# Patient Record
Sex: FEMALE | Race: WHITE | Employment: UNEMPLOYED | URBAN - METROPOLITAN AREA
[De-identification: names, ages, dates, MRNs, and addresses within clinical notes are randomized per-mention and may not be internally consistent; named-entity substitution may affect disease eponyms.]

---

## 2023-12-12 LAB — MAMMOGRAPHY, EXTERNAL: NORMAL

## 2024-01-03 ENCOUNTER — OFFICE VISIT (OUTPATIENT)
Facility: CLINIC | Age: 54
End: 2024-01-03
Payer: OTHER GOVERNMENT

## 2024-01-03 VITALS
SYSTOLIC BLOOD PRESSURE: 135 MMHG | HEIGHT: 67 IN | WEIGHT: 250 LBS | BODY MASS INDEX: 39.24 KG/M2 | HEART RATE: 73 BPM | DIASTOLIC BLOOD PRESSURE: 82 MMHG | OXYGEN SATURATION: 98 % | TEMPERATURE: 98.9 F

## 2024-01-03 DIAGNOSIS — J01.00 ACUTE NON-RECURRENT MAXILLARY SINUSITIS: ICD-10-CM

## 2024-01-03 DIAGNOSIS — E11.69 DIABETES MELLITUS TYPE 2 IN OBESE (HCC): ICD-10-CM

## 2024-01-03 DIAGNOSIS — G93.5 ARNOLD-CHIARI MALFORMATION, TYPE I (HCC): ICD-10-CM

## 2024-01-03 DIAGNOSIS — J45.909 ASTHMA, ENDOGENOUS: ICD-10-CM

## 2024-01-03 DIAGNOSIS — E78.00 HIGH CHOLESTEROL: ICD-10-CM

## 2024-01-03 DIAGNOSIS — E66.9 DIABETES MELLITUS TYPE 2 IN OBESE (HCC): ICD-10-CM

## 2024-01-03 DIAGNOSIS — F33.2 ENDOGENOUS DEPRESSION (HCC): ICD-10-CM

## 2024-01-03 DIAGNOSIS — I10 ESSENTIAL HYPERTENSION: ICD-10-CM

## 2024-01-03 DIAGNOSIS — J01.00 ACUTE NON-RECURRENT MAXILLARY SINUSITIS: Primary | ICD-10-CM

## 2024-01-03 DIAGNOSIS — R05.1 ACUTE COUGH: ICD-10-CM

## 2024-01-03 PROBLEM — E11.9 DIABETES MELLITUS (HCC): Status: ACTIVE | Noted: 2024-01-03

## 2024-01-03 PROBLEM — M76.60 ACHILLES TENDINITIS: Status: ACTIVE | Noted: 2024-01-03

## 2024-01-03 PROBLEM — E66.3 OVERWEIGHT: Status: ACTIVE | Noted: 2024-01-03

## 2024-01-03 PROBLEM — H66.91 ACUTE RIGHT OTITIS MEDIA: Status: ACTIVE | Noted: 2023-08-01

## 2024-01-03 PROBLEM — K21.9 ACID REFLUX: Status: ACTIVE | Noted: 2024-01-03

## 2024-01-03 PROBLEM — F32.A DEPRESSIVE DISORDER: Status: ACTIVE | Noted: 2024-01-03

## 2024-01-03 PROBLEM — M25.569 ARTHRALGIA OF KNEE: Status: ACTIVE | Noted: 2024-01-03

## 2024-01-03 PROBLEM — J44.9 COPD (CHRONIC OBSTRUCTIVE PULMONARY DISEASE) (HCC): Status: ACTIVE | Noted: 2024-01-03

## 2024-01-03 PROBLEM — G44.89 HEADACHE SYNDROME: Status: ACTIVE | Noted: 2024-01-03

## 2024-01-03 PROBLEM — M79.673 PAIN OF FOOT: Status: ACTIVE | Noted: 2024-01-03

## 2024-01-03 PROBLEM — L98.0 PYOGENIC GRANULOMA: Status: ACTIVE | Noted: 2024-01-03

## 2024-01-03 PROBLEM — M77.9 BONE SPUR: Status: ACTIVE | Noted: 2024-01-03

## 2024-01-03 PROBLEM — E04.2 MULTIPLE THYROID NODULES: Status: ACTIVE | Noted: 2024-01-03

## 2024-01-03 PROCEDURE — 3079F DIAST BP 80-89 MM HG: CPT | Performed by: INTERNAL MEDICINE

## 2024-01-03 PROCEDURE — 99203 OFFICE O/P NEW LOW 30 MIN: CPT | Performed by: INTERNAL MEDICINE

## 2024-01-03 PROCEDURE — 3075F SYST BP GE 130 - 139MM HG: CPT | Performed by: INTERNAL MEDICINE

## 2024-01-03 RX ORDER — OXYCODONE AND ACETAMINOPHEN 7.5; 325 MG/1; MG/1
1 TABLET ORAL EVERY 6 HOURS PRN
COMMUNITY

## 2024-01-03 RX ORDER — TELMISARTAN 40 MG/1
40 TABLET ORAL NIGHTLY
COMMUNITY

## 2024-01-03 RX ORDER — FLUOXETINE HYDROCHLORIDE 40 MG/1
40 CAPSULE ORAL DAILY
COMMUNITY

## 2024-01-03 RX ORDER — HYDROCHLOROTHIAZIDE 25 MG/1
25 TABLET ORAL DAILY
COMMUNITY
Start: 2023-10-16

## 2024-01-03 RX ORDER — FLUTICASONE PROPIONATE 50 MCG
2 SPRAY, SUSPENSION (ML) NASAL DAILY
COMMUNITY
Start: 2023-10-17

## 2024-01-03 RX ORDER — METFORMIN HYDROCHLORIDE 500 MG/1
1000 TABLET, EXTENDED RELEASE ORAL
COMMUNITY
Start: 2023-10-17

## 2024-01-03 RX ORDER — BENZONATATE 200 MG/1
200 CAPSULE ORAL 3 TIMES DAILY PRN
Qty: 30 CAPSULE | Refills: 0 | Status: SHIPPED | OUTPATIENT
Start: 2024-01-03 | End: 2024-01-13

## 2024-01-03 RX ORDER — ATORVASTATIN CALCIUM 20 MG/1
20 TABLET, FILM COATED ORAL NIGHTLY
COMMUNITY

## 2024-01-03 RX ORDER — FAMOTIDINE 20 MG/1
20 TABLET, FILM COATED ORAL 2 TIMES DAILY
COMMUNITY

## 2024-01-03 RX ORDER — COVID-19 ANTIGEN TEST
200 KIT MISCELLANEOUS 3 TIMES DAILY
COMMUNITY

## 2024-01-03 RX ORDER — AMOXICILLIN AND CLAVULANATE POTASSIUM 875; 125 MG/1; MG/1
1 TABLET, FILM COATED ORAL 2 TIMES DAILY
Qty: 20 TABLET | Refills: 0 | Status: SHIPPED | OUTPATIENT
Start: 2024-01-03 | End: 2024-01-13

## 2024-01-03 RX ORDER — BUTALBITAL, ACETAMINOPHEN AND CAFFEINE 50; 325; 40 MG/1; MG/1; MG/1
1 TABLET ORAL EVERY 4 HOURS PRN
COMMUNITY

## 2024-01-03 RX ORDER — MULTIVIT WITH MINERALS/LUTEIN
1000 TABLET ORAL DAILY
COMMUNITY

## 2024-01-03 RX ORDER — AMLODIPINE BESYLATE 10 MG/1
10 TABLET ORAL NIGHTLY
COMMUNITY
Start: 2023-10-16

## 2024-01-03 RX ORDER — ZINC GLUCONATE 50 MG
50 TABLET ORAL NIGHTLY
COMMUNITY

## 2024-01-03 SDOH — ECONOMIC STABILITY: HOUSING INSECURITY
IN THE LAST 12 MONTHS, WAS THERE A TIME WHEN YOU DID NOT HAVE A STEADY PLACE TO SLEEP OR SLEPT IN A SHELTER (INCLUDING NOW)?: NO

## 2024-01-03 SDOH — ECONOMIC STABILITY: FOOD INSECURITY: WITHIN THE PAST 12 MONTHS, THE FOOD YOU BOUGHT JUST DIDN'T LAST AND YOU DIDN'T HAVE MONEY TO GET MORE.: NEVER TRUE

## 2024-01-03 SDOH — ECONOMIC STABILITY: FOOD INSECURITY: WITHIN THE PAST 12 MONTHS, YOU WORRIED THAT YOUR FOOD WOULD RUN OUT BEFORE YOU GOT MONEY TO BUY MORE.: NEVER TRUE

## 2024-01-03 SDOH — ECONOMIC STABILITY: INCOME INSECURITY: HOW HARD IS IT FOR YOU TO PAY FOR THE VERY BASICS LIKE FOOD, HOUSING, MEDICAL CARE, AND HEATING?: NOT HARD AT ALL

## 2024-01-03 ASSESSMENT — PATIENT HEALTH QUESTIONNAIRE - PHQ9
6. FEELING BAD ABOUT YOURSELF - OR THAT YOU ARE A FAILURE OR HAVE LET YOURSELF OR YOUR FAMILY DOWN: 3
9. THOUGHTS THAT YOU WOULD BE BETTER OFF DEAD, OR OF HURTING YOURSELF: 0
3. TROUBLE FALLING OR STAYING ASLEEP: 0
5. POOR APPETITE OR OVEREATING: 0
SUM OF ALL RESPONSES TO PHQ QUESTIONS 1-9: 9
7. TROUBLE CONCENTRATING ON THINGS, SUCH AS READING THE NEWSPAPER OR WATCHING TELEVISION: 0
1. LITTLE INTEREST OR PLEASURE IN DOING THINGS: 3
SUM OF ALL RESPONSES TO PHQ9 QUESTIONS 1 & 2: 6
SUM OF ALL RESPONSES TO PHQ QUESTIONS 1-9: 9
2. FEELING DOWN, DEPRESSED OR HOPELESS: 3
8. MOVING OR SPEAKING SO SLOWLY THAT OTHER PEOPLE COULD HAVE NOTICED. OR THE OPPOSITE, BEING SO FIGETY OR RESTLESS THAT YOU HAVE BEEN MOVING AROUND A LOT MORE THAN USUAL: 0
10. IF YOU CHECKED OFF ANY PROBLEMS, HOW DIFFICULT HAVE THESE PROBLEMS MADE IT FOR YOU TO DO YOUR WORK, TAKE CARE OF THINGS AT HOME, OR GET ALONG WITH OTHER PEOPLE: 2
SUM OF ALL RESPONSES TO PHQ QUESTIONS 1-9: 9
SUM OF ALL RESPONSES TO PHQ QUESTIONS 1-9: 9

## 2024-01-03 ASSESSMENT — ENCOUNTER SYMPTOMS
NAUSEA: 0
SHORTNESS OF BREATH: 0
ABDOMINAL PAIN: 0
COUGH: 1
DIARRHEA: 0
VOMITING: 0

## 2024-01-03 NOTE — PATIENT INSTRUCTIONS
Advised sinus rinse and saline gargle. Can take Tylenol or aleve for discomfort,Delsym for cough ,zyrtec or allegra or coricidine  for congestion.Encouraged to do steam inhalation and increasing fluid intake.

## 2024-01-03 NOTE — PROGRESS NOTES
Positive for cough. Negative for shortness of breath.    Cardiovascular:  Negative for chest pain and leg swelling.   Gastrointestinal:  Negative for abdominal pain, diarrhea, nausea and vomiting.   Genitourinary:  Negative for dysuria.   Skin:  Negative for rash.   Neurological:  Negative for tremors and headaches.   Psychiatric/Behavioral:  The patient is not nervous/anxious.        /82 (Site: Right Upper Arm, Position: Sitting)   Pulse 73   Temp 98.9 °F (37.2 °C)   Ht 1.702 m (5' 7\")   Wt 113.4 kg (250 lb)   SpO2 98%   BMI 39.16 kg/m²      Physical Exam  Vitals and nursing note reviewed.    Gen:  Not  in no acute distress. Well built and nourished.Sickly with cough.  HEENT:  [place] conjunctivae, SANDRO, EOMI, hearing intact .  Positive bilateral maxillary sinus tenderness.  Mouth: Moist mucous membranes. No TPC  Neck:  Supple, without masses, thyroid not enlarged  Resp:  No accessory muscle use, clear breath sounds without wheezes rales or rhonchi  Card:  No murmurs, normal S1, S2 without thrills, bruits or peripheral edema  Abd:  Soft, non-tender, non-distended, normoactive bowel sounds are present, no palpable organomegaly and no detectable hernias  Lymph:  No cervical or inguinal adenopathy  Musc:  No cyanosis or clubbing.   Gait: Normal  Skin:  No rashes or ulcers, skin turgor is good  Neuro: Alert and oriented x 3. Cranial nerves are grossly intact, no focal motor weakness, follows commands appropriately  Psych:  Good insight, mood normal.     ASSESSMENT/PLAN:  Below is the assessment and plan developed based on review of pertinent history, physical exam, labs, studies, and medications.    1. Acute non-recurrent maxillary sinusitis  Comments:  Advised to do COVID testing.  Will place her on Augmentin.  Orders:  -     CBC with Auto Differential; Future  -     amoxicillin-clavulanate (AUGMENTIN) 875-125 MG per tablet; Take 1 tablet by mouth 2 times daily for 10 days, Disp-20 tablet, R-0Normal  2.

## 2024-05-21 LAB
ALBUMIN SERPL-MCNC: 4.4 G/DL (ref 3.8–4.9)
ALBUMIN/GLOB SERPL: 1.6 {RATIO} (ref 1.2–2.2)
ALP SERPL-CCNC: 117 IU/L (ref 44–121)
ALT SERPL-CCNC: 25 IU/L (ref 0–32)
AST SERPL-CCNC: 18 IU/L (ref 0–40)
BASOPHILS # BLD AUTO: 0.1 X10E3/UL (ref 0–0.2)
BASOPHILS NFR BLD AUTO: 1 %
BILIRUB SERPL-MCNC: 0.3 MG/DL (ref 0–1.2)
BUN SERPL-MCNC: 9 MG/DL (ref 6–24)
BUN/CREAT SERPL: 13 (ref 9–23)
CALCIUM SERPL-MCNC: 10 MG/DL (ref 8.7–10.2)
CHLORIDE SERPL-SCNC: 97 MMOL/L (ref 96–106)
CHOLEST SERPL-MCNC: 173 MG/DL (ref 100–199)
CO2 SERPL-SCNC: 26 MMOL/L (ref 20–29)
CREAT SERPL-MCNC: 0.71 MG/DL (ref 0.57–1)
EGFRCR SERPLBLD CKD-EPI 2021: 102 ML/MIN/1.73
EOSINOPHIL # BLD AUTO: 0.3 X10E3/UL (ref 0–0.4)
EOSINOPHIL NFR BLD AUTO: 5 %
ERYTHROCYTE [DISTWIDTH] IN BLOOD BY AUTOMATED COUNT: 14.1 % (ref 11.7–15.4)
GLOBULIN SER CALC-MCNC: 2.8 G/DL (ref 1.5–4.5)
GLUCOSE SERPL-MCNC: 210 MG/DL (ref 70–99)
HBA1C MFR BLD: 10.1 % (ref 4.8–5.6)
HCT VFR BLD AUTO: 44.4 % (ref 34–46.6)
HDLC SERPL-MCNC: 49 MG/DL
HGB BLD-MCNC: 14.2 G/DL (ref 11.1–15.9)
IMM GRANULOCYTES # BLD AUTO: 0 X10E3/UL (ref 0–0.1)
IMM GRANULOCYTES NFR BLD AUTO: 0 %
LDLC SERPL CALC-MCNC: 77 MG/DL (ref 0–99)
LYMPHOCYTES # BLD AUTO: 2.2 X10E3/UL (ref 0.7–3.1)
LYMPHOCYTES NFR BLD AUTO: 32 %
MCH RBC QN AUTO: 28.6 PG (ref 26.6–33)
MCHC RBC AUTO-ENTMCNC: 32 G/DL (ref 31.5–35.7)
MCV RBC AUTO: 89 FL (ref 79–97)
MONOCYTES # BLD AUTO: 0.6 X10E3/UL (ref 0.1–0.9)
MONOCYTES NFR BLD AUTO: 8 %
NEUTROPHILS # BLD AUTO: 3.7 X10E3/UL (ref 1.4–7)
NEUTROPHILS NFR BLD AUTO: 54 %
PLATELET # BLD AUTO: 264 X10E3/UL (ref 150–450)
POTASSIUM SERPL-SCNC: 4.1 MMOL/L (ref 3.5–5.2)
PROT SERPL-MCNC: 7.2 G/DL (ref 6–8.5)
RBC # BLD AUTO: 4.97 X10E6/UL (ref 3.77–5.28)
SODIUM SERPL-SCNC: 138 MMOL/L (ref 134–144)
TRIGL SERPL-MCNC: 293 MG/DL (ref 0–149)
TSH SERPL DL<=0.005 MIU/L-ACNC: 0.34 UIU/ML (ref 0.45–4.5)
VLDLC SERPL CALC-MCNC: 47 MG/DL (ref 5–40)
WBC # BLD AUTO: 6.9 X10E3/UL (ref 3.4–10.8)

## 2024-05-22 ENCOUNTER — OFFICE VISIT (OUTPATIENT)
Facility: CLINIC | Age: 54
End: 2024-05-22
Payer: OTHER GOVERNMENT

## 2024-05-22 VITALS
TEMPERATURE: 97.8 F | SYSTOLIC BLOOD PRESSURE: 126 MMHG | DIASTOLIC BLOOD PRESSURE: 85 MMHG | OXYGEN SATURATION: 98 % | WEIGHT: 228 LBS | RESPIRATION RATE: 16 BRPM | HEART RATE: 76 BPM | HEIGHT: 67 IN | BODY MASS INDEX: 35.79 KG/M2

## 2024-05-22 DIAGNOSIS — E05.90 SUBCLINICAL HYPERTHYROIDISM: ICD-10-CM

## 2024-05-22 DIAGNOSIS — J01.00 ACUTE NON-RECURRENT MAXILLARY SINUSITIS: ICD-10-CM

## 2024-05-22 DIAGNOSIS — G93.5 ARNOLD-CHIARI MALFORMATION, TYPE I (HCC): ICD-10-CM

## 2024-05-22 DIAGNOSIS — I10 ESSENTIAL HYPERTENSION: ICD-10-CM

## 2024-05-22 DIAGNOSIS — E11.65 TYPE 2 DIABETES MELLITUS WITH HYPERGLYCEMIA, WITHOUT LONG-TERM CURRENT USE OF INSULIN (HCC): Primary | ICD-10-CM

## 2024-05-22 DIAGNOSIS — R42 DIZZINESS: ICD-10-CM

## 2024-05-22 DIAGNOSIS — E78.00 HIGH CHOLESTEROL: ICD-10-CM

## 2024-05-22 DIAGNOSIS — G44.89 HEADACHE SYNDROME: ICD-10-CM

## 2024-05-22 DIAGNOSIS — F33.2 ENDOGENOUS DEPRESSION (HCC): ICD-10-CM

## 2024-05-22 PROCEDURE — 3074F SYST BP LT 130 MM HG: CPT | Performed by: INTERNAL MEDICINE

## 2024-05-22 PROCEDURE — 99214 OFFICE O/P EST MOD 30 MIN: CPT | Performed by: INTERNAL MEDICINE

## 2024-05-22 PROCEDURE — 3046F HEMOGLOBIN A1C LEVEL >9.0%: CPT | Performed by: INTERNAL MEDICINE

## 2024-05-22 PROCEDURE — 3079F DIAST BP 80-89 MM HG: CPT | Performed by: INTERNAL MEDICINE

## 2024-05-22 RX ORDER — MECLIZINE HYDROCHLORIDE 25 MG/1
25 TABLET ORAL 3 TIMES DAILY PRN
Qty: 30 TABLET | Refills: 0 | Status: SHIPPED | OUTPATIENT
Start: 2024-05-22 | End: 2024-06-01

## 2024-05-22 RX ORDER — CEFDINIR 300 MG/1
300 CAPSULE ORAL 2 TIMES DAILY
Qty: 20 CAPSULE | Refills: 0 | Status: SHIPPED | OUTPATIENT
Start: 2024-05-22 | End: 2024-06-01

## 2024-05-22 RX ORDER — PANCRELIPASE 36000; 180000; 114000 [USP'U]/1; [USP'U]/1; [USP'U]/1
CAPSULE, DELAYED RELEASE PELLETS ORAL
COMMUNITY
Start: 2024-05-06

## 2024-05-22 RX ORDER — OMEPRAZOLE 20 MG/1
20 CAPSULE, DELAYED RELEASE ORAL DAILY
COMMUNITY
Start: 2024-05-15

## 2024-05-22 RX ORDER — POTASSIUM CHLORIDE 750 MG/1
10 TABLET, FILM COATED, EXTENDED RELEASE ORAL 2 TIMES DAILY
COMMUNITY
Start: 2024-04-23 | End: 2024-05-22 | Stop reason: ALTCHOICE

## 2024-05-22 RX ORDER — GLYBURIDE-METFORMIN HYDROCHLORIDE 5; 500 MG/1; MG/1
1 TABLET ORAL 2 TIMES DAILY WITH MEALS
Qty: 180 TABLET | Refills: 3 | Status: SHIPPED | OUTPATIENT
Start: 2024-05-22

## 2024-05-22 RX ORDER — LANCETS 30 GAUGE
1 EACH MISCELLANEOUS 2 TIMES DAILY
Qty: 100 EACH | Refills: 5 | Status: SHIPPED | OUTPATIENT
Start: 2024-05-22

## 2024-05-22 RX ORDER — GLUCOSAMINE HCL/CHONDROITIN SU 500-400 MG
CAPSULE ORAL
Qty: 100 STRIP | Refills: 3 | Status: SHIPPED | OUTPATIENT
Start: 2024-05-22

## 2024-05-22 RX ORDER — BLOOD-GLUCOSE METER
1 KIT MISCELLANEOUS DAILY
Qty: 1 KIT | Refills: 0 | Status: SHIPPED | OUTPATIENT
Start: 2024-05-22

## 2024-05-22 ASSESSMENT — ENCOUNTER SYMPTOMS
DIARRHEA: 0
ABDOMINAL PAIN: 0
SHORTNESS OF BREATH: 0
VOMITING: 0
COUGH: 0
NAUSEA: 0

## 2024-05-22 NOTE — ASSESSMENT & PLAN NOTE
A1c 10.1.  Seems like patient was unable to tolerate metformin last 2 months because of vomiting and diarrhea.  Will change to Glucovance 5/500 twice a day.  Also will make appointment with endocrinologist and for diabetic education classes.

## 2024-05-22 NOTE — PROGRESS NOTES
Chief Complaint   Patient presents with    Follow-up Chronic Condition    Dizziness     C/O feeling \"off balance\" this morning    Discuss Labs             /85 (Site: Right Upper Arm, Position: Sitting)   Pulse 76   Temp 97.8 °F (36.6 °C)   Resp 16   Ht 1.702 m (5' 7\")   Wt 103.4 kg (228 lb)   SpO2 98%   BMI 35.71 kg/m²           \"Have you been to the ER, urgent care clinic since your last visit?  Hospitalized since your last visit?\"    YES - When: approximately 2 months ago.  Where and Why: [place] stomach issues .    “Have you seen or consulted any other health care providers outside of [facility] since your last visit?”    YES - When: approximately 1 months ago.  Where and Why: [facility].    Have you had a mammogram?”   YES - Where: [place]  Nurse/CMA to request most recent records if not in the chart    No breast cancer screening on file      “Have you had a pap smear?”    YES - Where: 2023 [facility]  Nurse/CMA to request most recent records if not in the chart    No cervical cancer screening on file         “Have you had a colorectal cancer screening such as a colonoscopy/FIT/Cologuard?    NO    No colonoscopy on file  No cologuard on file  No FIT/FOBT on file   No flexible sigmoidoscopy on file         Click Here for Release of Records Request    
1 tablet by mouth 3 times daily as needed for Dizziness, Disp-30 tablet, R-0Normal  4. Essential hypertension  Comments:  Blood pressure controlled.  Current medications.  5. Arnold-Chiari malformation, type I (HCC)  Comments:  Waiting for neurology appointment  6. Endogenous depression (HCC)  Comments:  Will make appointment with psychiatrist  Orders:  -     Amb External Referral To Psychiatry  7. High cholesterol  -     Lipid Panel; Future  8. Headache syndrome  Comments:  Waiting for appointment with the neurologist  9. Subclinical hyperthyroidism  Comments:  TSH mildly suppressed.  Will make appointment with endocrinologist  Orders:  -     TSH reflex to T4F; Future      Orders Placed This Encounter    Microalbumin / Creatinine Urine Ratio     Standing Status:   Future     Standing Expiration Date:   5/22/2025    Urinalysis     Standing Status:   Future     Standing Expiration Date:   5/22/2025    Lipid Panel     Standing Status:   Future     Standing Expiration Date:   5/22/2025     Order Specific Question:   Is Patient Fasting?/# of Hours     Answer:   Yes     Order Specific Question:   Has the patient fasted?     Answer:   Yes    Comprehensive Metabolic Panel     Standing Status:   Future     Standing Expiration Date:   5/22/2025    Hemoglobin A1C     Standing Status:   Future     Standing Expiration Date:   5/22/2025    TSH reflex to T4F     Standing Status:   Future     Standing Expiration Date:   5/22/2025    Ambulatory referral to Diabetic Education     Referral Priority:   Routine     Referral Type:   Eval and Treat     Referral Reason:   Specialty Services Required     Number of Visits Requested:   1    [facility] - Connie Hooker MD, Endocrinology, [place]     Referral Priority:   Routine     Referral Type:   Eval and Treat     Referral Reason:   Specialty Services Required     Referred to Provider:   Connie Hooker MD     Requested Specialty:   Endocrinology     Number of Visits Requested:   1

## 2024-05-29 ENCOUNTER — TELEPHONE (OUTPATIENT)
Facility: CLINIC | Age: 54
End: 2024-05-29

## 2024-05-29 NOTE — TELEPHONE ENCOUNTER
Patient states the prescriber in [place] are still having issues with their system and she wants to know if you can send butalbital-acetaminophen-caffeine -40 MG, Tizanidine 4  quantity 90 day supply. Please send to [facility] in [facility] on [address]

## 2024-05-31 NOTE — TELEPHONE ENCOUNTER
Please let patient know I am not  comfortable in writing 90-day supplies of Fioricet and tizanidine.

## 2024-06-10 ENCOUNTER — OFFICE VISIT (OUTPATIENT)
Age: 54
End: 2024-06-10
Payer: OTHER GOVERNMENT

## 2024-06-10 DIAGNOSIS — E11.65 TYPE 2 DIABETES MELLITUS WITH HYPERGLYCEMIA, WITHOUT LONG-TERM CURRENT USE OF INSULIN (HCC): Primary | ICD-10-CM

## 2024-06-10 PROCEDURE — G0108 DIAB MANAGE TRN  PER INDIV: HCPCS | Performed by: DIETITIAN, REGISTERED

## 2024-06-10 NOTE — PROGRESS NOTES
next month, I will begin to eat more balanced meals and snacks. 5   2. Within the next month, I will choose an exercise activity and I will start fitting it into my schedule. 8   3. Within the next month, I will make a list of stress management options that work for me. 6   4. Within the next month, I will consistently plan ahead to prevent low blood sugars. 4   5. Within the next month, I will start adjusting my insulin doses on my own. 0   6. Within the next month, I will begin making changes to my diabetes management based on my daily blood sugars (eg - eating, activity and/or insulin). 6   7. Within the next month, I will begin making changes to my diabetes management to meet my overall goals (eg - eating, activity and/or insulin). 6

## 2024-07-09 ENCOUNTER — NURSE ONLY (OUTPATIENT)
Age: 54
End: 2024-07-09
Payer: OTHER GOVERNMENT

## 2024-07-09 DIAGNOSIS — E11.9 TYPE 2 DIABETES MELLITUS WITHOUT COMPLICATION, WITHOUT LONG-TERM CURRENT USE OF INSULIN (HCC): Primary | ICD-10-CM

## 2024-07-09 PROCEDURE — G0109 DIAB MANAGE TRN IND/GROUP: HCPCS

## 2024-07-10 ENCOUNTER — OFFICE VISIT (OUTPATIENT)
Age: 54
End: 2024-07-10
Payer: OTHER GOVERNMENT

## 2024-07-10 VITALS
HEART RATE: 75 BPM | TEMPERATURE: 98.3 F | HEIGHT: 67 IN | SYSTOLIC BLOOD PRESSURE: 130 MMHG | DIASTOLIC BLOOD PRESSURE: 77 MMHG | WEIGHT: 237 LBS | BODY MASS INDEX: 37.2 KG/M2 | OXYGEN SATURATION: 99 %

## 2024-07-10 DIAGNOSIS — E06.9 THYROIDITIS: Primary | ICD-10-CM

## 2024-07-10 DIAGNOSIS — E78.2 MIXED HYPERLIPIDEMIA: ICD-10-CM

## 2024-07-10 DIAGNOSIS — E11.65 TYPE 2 DIABETES MELLITUS WITH HYPERGLYCEMIA, UNSPECIFIED WHETHER LONG TERM INSULIN USE (HCC): ICD-10-CM

## 2024-07-10 DIAGNOSIS — E04.2 MULTINODULAR GOITER: ICD-10-CM

## 2024-07-10 LAB
T4 FREE SERPL-MCNC: 1.1 NG/DL (ref 0.8–1.5)
TSH SERPL DL<=0.05 MIU/L-ACNC: 0.32 UIU/ML (ref 0.36–3.74)

## 2024-07-10 PROCEDURE — 3075F SYST BP GE 130 - 139MM HG: CPT | Performed by: INTERNAL MEDICINE

## 2024-07-10 PROCEDURE — 99244 OFF/OP CNSLTJ NEW/EST MOD 40: CPT | Performed by: INTERNAL MEDICINE

## 2024-07-10 PROCEDURE — 3078F DIAST BP <80 MM HG: CPT | Performed by: INTERNAL MEDICINE

## 2024-07-10 NOTE — PATIENT INSTRUCTIONS
I have ordered scan/test and if you do not hear from the hospital in 3- 4 business days  please call the number [phone] to speak with the scheduling team directly.

## 2024-07-10 NOTE — PROGRESS NOTES
[facility] DIABETES AND ENDOCRINOLOGY               Connie Hooker MD        Patient Information  Name : Nikki Lozano 53 y.o.     YOB: 1970         Referred by: Oneida Fang MD         Chief Complaint   Patient presents with    New Patient    Thyroid Problem       History of present illness    Nikki Lozano is a 53 y.o. female  here for evaluation of multinodular goiter which was diagnosed in 2007, had FNA in [place], then in 2022 in [place], FNA was benign  Feels cold  No change in the size of the neck or neck pain.No dysphagia,dysphonia or dyspnea.  Denies nervousness,shakiness,palpitations  No heat intolerance,  No history of known radiation exposure  FH of thyroid disease: Sister.No FH of thyroid cancer    Type 2 diabetes mellitus: Was off medications, last A1c 10, she has changed the diet, has seen dietitian, recently started on Glucovance, fasting blood glucose less than 120  She is planning to lose weight  No severe hypoglycemia  Was on metformin in the past  Underlying EPI, on Creon  No history of pancreatitis, no family history of medullary thyroid cancer      Wt Readings from Last 3 Encounters:   07/10/24 107.5 kg (237 lb)   05/22/24 103.4 kg (228 lb)   01/03/24 113.4 kg (250 lb)       Past Medical History:   Diagnosis Date    Allergies     Asthma     Depression     Diabetes (HCC)     GERD (gastroesophageal reflux disease)     Migraine        Current Outpatient Medications   Medication Sig    CREON 97112-979587 units CPEP delayed release capsule Take by mouth    omeprazole (PRILOSEC) 20 MG delayed release capsule Take 1 capsule by mouth Daily    glyBURIDE-metFORMIN (GLUCOVANCE) 5-500 MG per tablet Take 1 tablet by mouth 2 times daily (with meals)    glucose monitoring kit 1 kit by Does not apply route daily Use twice daily glucose testing. E11.65    blood glucose monitor strips Test 2 times a day & as needed for symptoms of irregular blood glucose. Dispense sufficient amount for

## 2024-07-10 NOTE — PROGRESS NOTES
[facility]  Diabetes Self-Management Education & Support Program  Encounter Note      SUMMARY  Diabetes self-care management training was completed related to reducing risks. The participant will return on July 16 to continue DSMES related to healthy eating and monitoring. The participant did identify SMART Goal(s) and will practice knowledge and skills related to reducing risks to improve diabetes self-management.        EVALUATION:  Cristel shared in group discussions during class.  She identified her diabetes care team.  She is up to date on annual vaccines and annual exams with the exception of eye exam.  She shared that she snores loudly at night when discussing sleep apnea.      RECOMMENDATIONS:  Make appt for annual eye exam     TOPICS DISCUSSED TODAY:  WHAT IS DIABETES? Minutes: 60  HOW DO I STAY HEALTHY? 60      Next provider visit is scheduled for 7/16/24       SMART GOAL(S)   Make appointment for annual diabetic eye exam  ACHIEVEMENT OF GOAL(S) : 0-24%    2.   Check blood glucose 1 times per day over the next week.- fasting blood glucose  ACHIEVEMENT OF GOAL(S) :  0-24%         DATE DSMES TOPIC EVALUATION     7/10/2024 WHAT IS DIABETES?   Role of the normal pancreas in energy balance and blood glucose control   The defect seen in diabetes   Signs & symptoms of diabetes   Diagnosis of diabetes   Types of diabetes   Blood glucose targets in non-pregnant & non-pregnant adults       The participant knows  Their type of diabetes: Yes   The basic physiologic defect: Yes  Blood glucose targets: Yes     DATE DSMES TOPIC EVALUATION     7/10/2024 HOW DO I STAY HEALTHY?   Prevention   Vaccinations   Preconception care (if applicable)  Examinations   Eye    Foot   Diabetic complications' prevention   Dental health   Heart health   Kidney Health   Nerve health   Sleep health      The participant has a personal diabetes care record to keep abreast of diabetes health Yes     .

## 2024-07-11 LAB
T3 SERPL-MCNC: 118 NG/DL (ref 71–180)
THYROPEROXIDASE AB SERPL-ACNC: <9 IU/ML (ref 0–34)

## 2024-07-16 ENCOUNTER — NURSE ONLY (OUTPATIENT)
Age: 54
End: 2024-07-16
Payer: OTHER GOVERNMENT

## 2024-07-16 DIAGNOSIS — E11.9 TYPE 2 DIABETES MELLITUS WITHOUT COMPLICATION, WITHOUT LONG-TERM CURRENT USE OF INSULIN (HCC): Primary | ICD-10-CM

## 2024-07-16 PROCEDURE — G0109 DIAB MANAGE TRN IND/GROUP: HCPCS

## 2024-07-17 DIAGNOSIS — E11.65 TYPE 2 DIABETES MELLITUS WITH HYPERGLYCEMIA, WITHOUT LONG-TERM CURRENT USE OF INSULIN (HCC): ICD-10-CM

## 2024-07-17 PROBLEM — E06.9 THYROIDITIS: Status: ACTIVE | Noted: 2024-07-17

## 2024-07-17 PROBLEM — E78.2 MIXED HYPERLIPIDEMIA: Status: ACTIVE | Noted: 2024-07-17

## 2024-07-17 RX ORDER — GLYBURIDE-METFORMIN HYDROCHLORIDE 5; 500 MG/1; MG/1
1 TABLET ORAL 2 TIMES DAILY WITH MEALS
Qty: 180 TABLET | Refills: 1 | Status: SHIPPED | OUTPATIENT
Start: 2024-07-17

## 2024-07-17 NOTE — PROGRESS NOTES
selection    Technique for obtaining blood droplet   Blood glucose testing sites   Determining best times to test   Pregnancy recommendations   Data sharing with provider        The participant   Can demonstrate their glucometer procedure: Yes  Logs their BG readings:  Yes           TERESA BRADLEY RN on 7/17/2024 at 10:39 AM    I have personally reviewed the health record, including provider notes, laboratory data and current medications before making these care and education recommendations. The time spent in this effort is included in the total time.  Total minutes: 120

## 2024-07-18 RX ORDER — FLUOXETINE HYDROCHLORIDE 40 MG/1
40 CAPSULE ORAL DAILY
Qty: 90 CAPSULE | Refills: 0 | Status: SHIPPED | OUTPATIENT
Start: 2024-07-18

## 2024-07-23 ENCOUNTER — NURSE ONLY (OUTPATIENT)
Age: 54
End: 2024-07-23
Payer: OTHER GOVERNMENT

## 2024-07-23 DIAGNOSIS — E11.65 TYPE 2 DIABETES MELLITUS WITH HYPERGLYCEMIA, WITHOUT LONG-TERM CURRENT USE OF INSULIN (HCC): Primary | ICD-10-CM

## 2024-07-23 PROCEDURE — G0109 DIAB MANAGE TRN IND/GROUP: HCPCS | Performed by: DIETITIAN, REGISTERED

## 2024-07-23 NOTE — PROGRESS NOTES
[facility]  Diabetes Self-Management Education & Support Program  Encounter Note      SUMMARY  Diabetes self-care management training was completed related to taking medications and physical activity. The participant will return on July 30 to continue DSMES related to healthy coping and problem solving. The participant did identify SMART Goal(s) and will practice knowledge and skills related to reducing risks, healthy eating and monitoring, and being active and medications to improve diabetes self-management.        EVALUATION:  Ms. Lozano shared in group discussions. She told the class that she experiences difficulty remembering to take her evening Metformin. She participated in group exercise activity. She admits to still drinking Pepsi throughout the day.    RECOMMENDATIONS:  Aim for 150 minutes of moderate to intense PA per week  Set an alarm for remembering to take evening metformin     TOPICS DISCUSSED TODAY:  HOW DO MY DIABETES MEDICATIONS WORK? 60  HOW DOES PHYSICAL ACTIVITY AFFECT MY DIABETES? 60      Next provider visit is scheduled for 7/26/24 c Dr. Huerta       SMART GOAL(S)  Practice building a balanced meal for lunch and dinner at least 3 times over the next week   ACHIEVEMENT OF GOAL(S) : 25-49%    2.   Increase physical activity by doing chair exercise with her   for 20  minutes 3 times over the next week.  ACHIEVEMENT OF GOAL(S) :  0-24%         DATE DSMES TOPIC EVALUATION     7/23/2024 HOW DO MY DIABETES MEDICATIONS WORK?   Type 1 medications & methods   Insulin injections   Injection sites   Type 2 medications   Oral agents   GLP-1 agonists   Hypoglycemia symptoms & treatment   Glucagon emergency kits   General guidance regarding insulin use whether Type 1, 2 or gestational diabetes   Storage of insulin   Disposal    Traveling with medications   Barriers to medication adherence      The participant   Can describe the expected action & side effects of prescribed

## 2024-07-26 ENCOUNTER — TELEPHONE (OUTPATIENT)
Dept: PRIMARY CARE CLINIC | Facility: CLINIC | Age: 54
End: 2024-07-26

## 2024-07-26 ENCOUNTER — OFFICE VISIT (OUTPATIENT)
Dept: PRIMARY CARE CLINIC | Facility: CLINIC | Age: 54
End: 2024-07-26

## 2024-07-26 VITALS
OXYGEN SATURATION: 98 % | HEIGHT: 67 IN | SYSTOLIC BLOOD PRESSURE: 120 MMHG | DIASTOLIC BLOOD PRESSURE: 85 MMHG | BODY MASS INDEX: 37.29 KG/M2 | RESPIRATION RATE: 16 BRPM | WEIGHT: 237.6 LBS | HEART RATE: 85 BPM | TEMPERATURE: 98.5 F

## 2024-07-26 DIAGNOSIS — G93.5 ARNOLD-CHIARI MALFORMATION, TYPE I (HCC): ICD-10-CM

## 2024-07-26 DIAGNOSIS — G93.5 ARNOLD-CHIARI MALFORMATION, TYPE I (HCC): Primary | ICD-10-CM

## 2024-07-26 DIAGNOSIS — M19.041 LOCALIZED OSTEOARTHRITIS OF RIGHT HAND: ICD-10-CM

## 2024-07-26 DIAGNOSIS — M65.4 DE QUERVAIN'S DISEASE (TENOSYNOVITIS): ICD-10-CM

## 2024-07-26 DIAGNOSIS — M25.561 ACUTE PAIN OF RIGHT KNEE: ICD-10-CM

## 2024-07-26 RX ORDER — OXYCODONE AND ACETAMINOPHEN 7.5; 325 MG/1; MG/1
1 TABLET ORAL EVERY 6 HOURS PRN
Qty: 120 TABLET | Refills: 0 | Status: SHIPPED | OUTPATIENT
Start: 2024-07-26 | End: 2024-08-25

## 2024-07-26 RX ORDER — TRIAMCINOLONE ACETONIDE 40 MG/ML
10 INJECTION, SUSPENSION INTRA-ARTICULAR; INTRAMUSCULAR ONCE
Status: COMPLETED | OUTPATIENT
Start: 2024-07-26 | End: 2024-07-26

## 2024-07-26 RX ORDER — TRIAMCINOLONE ACETONIDE 40 MG/ML
20 INJECTION, SUSPENSION INTRA-ARTICULAR; INTRAMUSCULAR ONCE
Status: COMPLETED | OUTPATIENT
Start: 2024-07-26 | End: 2024-07-26

## 2024-07-26 RX ORDER — TIZANIDINE HYDROCHLORIDE 4 MG/1
4 CAPSULE, GELATIN COATED ORAL 3 TIMES DAILY
COMMUNITY

## 2024-07-26 RX ORDER — OXYCODONE AND ACETAMINOPHEN 7.5; 325 MG/1; MG/1
1 TABLET ORAL EVERY 6 HOURS PRN
Qty: 120 TABLET | Refills: 0 | Status: SHIPPED | OUTPATIENT
Start: 2024-07-26 | End: 2024-07-26 | Stop reason: SDUPTHER

## 2024-07-26 RX ORDER — BUTALBITAL, ACETAMINOPHEN AND CAFFEINE 50; 325; 40 MG/1; MG/1; MG/1
2 TABLET ORAL EVERY 6 HOURS PRN
Qty: 180 TABLET | Refills: 3 | Status: SHIPPED | OUTPATIENT
Start: 2024-07-26

## 2024-07-26 RX ORDER — TIZANIDINE 4 MG/1
4 TABLET ORAL 3 TIMES DAILY PRN
Qty: 270 TABLET | Refills: 3 | Status: SHIPPED | OUTPATIENT
Start: 2024-07-26

## 2024-07-26 RX ADMIN — TRIAMCINOLONE ACETONIDE 10 MG: 40 INJECTION, SUSPENSION INTRA-ARTICULAR; INTRAMUSCULAR at 08:49

## 2024-07-26 RX ADMIN — TRIAMCINOLONE ACETONIDE 20 MG: 40 INJECTION, SUSPENSION INTRA-ARTICULAR; INTRAMUSCULAR at 08:50

## 2024-07-26 NOTE — PROGRESS NOTES
\"Have you been to YES - When: approximately 1 months ago.  Where and Why: [facility] ER, urgent care clinic since your last visit?  Hospitalized since your last visit?\"        “Have you seen or consulted any other health care providers outside of [facility] since your last visit?”    NO    “Have you had a colorectal cancer screening such as a colonoscopy/FIT/Cologuard?    NO    No colonoscopy on file  No cologuard on file  No FIT/FOBT on file   No flexible sigmoidoscopy on file         “Have you had a pap smear?”    YES - Where: [facility] 03//2024  Nurse/CMA to request most recent records if not in the chart    No cervical cancer screening on file

## 2024-07-26 NOTE — TELEPHONE ENCOUNTER
Patient is requesting that oxyCODONE-acetaminophen (PERCOCET) 7.5-325 MG per tablet is sent to [facility] in [facility] due to the other [facility] not having the medication in stock.

## 2024-07-26 NOTE — ASSESSMENT & PLAN NOTE
Patient reports chronic headaches and pain from the Chiari malformation that she has been taking 7.5 mg oxycodone 4 times a day for years.  She has a scheduled neurology to discuss medication next month.  I will refill a months worth now to bridge her.  I will continue her previous tizanidine and Fioricet as well.

## 2024-07-26 NOTE — ASSESSMENT & PLAN NOTE
Patient reports about 5 weeks of right-sided pain following a long day of walking and then twisting in bed which caused a pop.  She reports being evaluated with an x-ray which was normal except for some arthritis.  She reports that pain has improved some since then.  Will continue to monitor and treat with rest, compression and NSAIDs.  If pain not improving or worsening will discuss either MRI or Ortho referral.

## 2024-07-26 NOTE — PROGRESS NOTES
Subjective  Chief Complaint   Patient presents with    New Patient     Medication      HPI:  Nikki Lozano is a 54 y.o. female.     Patient reports about 5 weeks of right-sided pain following a long day of walking and then twisting in bed which caused a pop.  She reports being evaluated with an x-ray which was normal except for some arthritis.  She reports that pain has improved some since then.     Patient reports recurrent OA pain in right second DIP.  She request a cortisone injection as that has worked in the past.    De Quervain's tenosynovitis - Patient reports recurrent tendinitis from injury 10 years ago and daily activities. She request a cortisone shot.      Patient reports chronic headaches and pain from the Chiari malformation that she has been taking 7.5 mg oxycodone 4 times a day for years.  She has a scheduled neurology to discuss medication next month     Past Medical History:   Diagnosis Date    Allergies     Asthma     Depression     Diabetes (HCC)     GERD (gastroesophageal reflux disease)     Migraine      Current Outpatient Medications on File Prior to Visit   Medication Sig Dispense Refill    tiZANidine (ZANAFLEX) 4 MG capsule Take 1 capsule by mouth 3 times daily      FLUoxetine (PROZAC) 40 MG capsule Take 1 capsule by mouth daily (Patient taking differently: Take 1 capsule by mouth 2 times daily) 90 capsule 0    glyBURIDE-metFORMIN (GLUCOVANCE) 5-500 MG per tablet Take 1 tablet by mouth 2 times daily (with meals) 180 tablet 1    CREON 55877-506284 units CPEP delayed release capsule Take by mouth      omeprazole (PRILOSEC) 20 MG delayed release capsule Take 1 capsule by mouth Daily      glucose monitoring kit 1 kit by Does not apply route daily Use twice daily glucose testing. E11.65 1 kit 0    blood glucose monitor strips Test 2 times a day & as needed for symptoms of irregular blood glucose. Dispense sufficient amount for indicated testing frequency plus additional to accommodate PRN testing

## 2024-07-26 NOTE — ASSESSMENT & PLAN NOTE
Patient reports recurrent OA pain in right second DIP.  She request a cortisone injection as that has worked in the past.  Her last injection was a year ago.  Shot given today.

## 2024-07-30 ENCOUNTER — NURSE ONLY (OUTPATIENT)
Age: 54
End: 2024-07-30
Payer: OTHER GOVERNMENT

## 2024-07-30 DIAGNOSIS — E11.65 CONTROLLED TYPE 2 DIABETES MELLITUS WITH HYPERGLYCEMIA, WITHOUT LONG-TERM CURRENT USE OF INSULIN (HCC): Primary | ICD-10-CM

## 2024-07-30 PROCEDURE — G0109 DIAB MANAGE TRN IND/GROUP: HCPCS

## 2024-07-30 NOTE — PROGRESS NOTES
[facility]  Diabetes Self-Management Education & Support Program  Encounter Note      SUMMARY  Diabetes self-care management training was completed related to healthy coping and problem solving. The participant will return in 6 weeks to follow up on DSMES related to reducing risks, healthy eating, monitoring, taking medications, physical activity, healthy coping, and problem solving. The participant did not identify SMART Goal(s) and will practice knowledge and skills related to reducing risks, healthy eating and monitoring, being active and medications, and healthy coping and problem solving to improve diabetes self-management.        EVALUATION:  Participant shared in group discussions.  She shared that she likes to spend time in nature and around water,  She shared that she wants to find a psychiatrist to help with treating her depression.  She states that she ordered her medical ID bracelet.   She participated in group meditation during class.  Verbalized understanding of problem solving techniques and blood glucose pattern management.    RECOMMENDATIONS:  Continue using skills obtained in DSMES  classes  6 week follow up     TOPICS DISCUSSED TODAY:  HOW DO I FIND SUPPORT TO TACKLE THIS CONDITION? 60  HOW DO I FIGURE OUT WHAT'S INFLUENCING MY BLOOD GLUCOSES? 60      Next provider visit is scheduled for 6 weeks         DATE DSMES TOPIC EVALUATION     7/30/2024 HOW DO I FIND SUPPORT TO TACKLE THIS CONDITION?   Normal responses to diabetes diagnosis or complication   Shock   Anger & resentment   Guilt/self-blame   Sadness & worry   Depression    Anxiety   Pregnancy   Constructive strategies to normal responses    Exploring feelings & attitudes   Motivation: Cost versus benefits analysis   Problem-solving: Chain analysis   Obtaining support: Communicating   Family & friends   Health team   iii. Community   Stress   Symptoms   Managing stress   Fill your tool kit        The participant

## 2024-08-09 ENCOUNTER — TELEPHONE (OUTPATIENT)
Age: 54
End: 2024-08-09

## 2024-08-09 NOTE — TELEPHONE ENCOUNTER
called pt and let her know that this practice does not treat Chiari. advised pt to either consult with neurosurgery or academic facility such as [facility]

## 2024-08-12 ENCOUNTER — PATIENT MESSAGE (OUTPATIENT)
Dept: PRIMARY CARE CLINIC | Facility: CLINIC | Age: 54
End: 2024-08-12

## 2024-08-12 DIAGNOSIS — M25.561 ACUTE PAIN OF RIGHT KNEE: Primary | ICD-10-CM

## 2024-08-12 DIAGNOSIS — G93.5 ARNOLD-CHIARI MALFORMATION, TYPE I (HCC): ICD-10-CM

## 2024-08-13 NOTE — TELEPHONE ENCOUNTER
I placed orders for an MRI and [facility] neurology referral. If you do not get contacted for scheduling in the next week, let me know.

## 2024-08-22 ENCOUNTER — HOSPITAL ENCOUNTER (OUTPATIENT)
Facility: HOSPITAL | Age: 54
Discharge: HOME | End: 2024-08-22
Attending: FAMILY MEDICINE
Payer: OTHER GOVERNMENT

## 2024-08-22 DIAGNOSIS — M25.561 ACUTE PAIN OF RIGHT KNEE: ICD-10-CM

## 2024-08-22 PROCEDURE — 73721 MRI JNT OF LWR EXTRE W/O DYE: CPT

## 2024-08-23 ENCOUNTER — OFFICE VISIT (OUTPATIENT)
Dept: PRIMARY CARE CLINIC | Facility: CLINIC | Age: 54
End: 2024-08-23
Payer: OTHER GOVERNMENT

## 2024-08-23 VITALS
TEMPERATURE: 98.8 F | HEIGHT: 67 IN | OXYGEN SATURATION: 98 % | RESPIRATION RATE: 16 BRPM | SYSTOLIC BLOOD PRESSURE: 144 MMHG | HEART RATE: 81 BPM | WEIGHT: 238 LBS | BODY MASS INDEX: 37.35 KG/M2 | DIASTOLIC BLOOD PRESSURE: 79 MMHG

## 2024-08-23 DIAGNOSIS — U07.1 COVID: ICD-10-CM

## 2024-08-23 DIAGNOSIS — M25.561 ACUTE PAIN OF RIGHT KNEE: Primary | ICD-10-CM

## 2024-08-23 DIAGNOSIS — G93.5 ARNOLD-CHIARI MALFORMATION, TYPE I (HCC): ICD-10-CM

## 2024-08-23 DIAGNOSIS — I10 ESSENTIAL HYPERTENSION: ICD-10-CM

## 2024-08-23 PROCEDURE — 99214 OFFICE O/P EST MOD 30 MIN: CPT | Performed by: FAMILY MEDICINE

## 2024-08-23 PROCEDURE — 3078F DIAST BP <80 MM HG: CPT | Performed by: FAMILY MEDICINE

## 2024-08-23 PROCEDURE — 3077F SYST BP >= 140 MM HG: CPT | Performed by: FAMILY MEDICINE

## 2024-08-23 RX ORDER — AMLODIPINE BESYLATE 10 MG/1
10 TABLET ORAL NIGHTLY
Qty: 90 TABLET | Refills: 3 | Status: SHIPPED | OUTPATIENT
Start: 2024-08-23

## 2024-08-23 RX ORDER — OXYCODONE AND ACETAMINOPHEN 7.5; 325 MG/1; MG/1
1 TABLET ORAL EVERY 6 HOURS PRN
Qty: 120 TABLET | Refills: 0 | Status: SHIPPED | OUTPATIENT
Start: 2024-08-23 | End: 2024-09-22

## 2024-08-23 NOTE — ASSESSMENT & PLAN NOTE
Patient was unable to see Dr. Pina, as he reported he only treats Chiari malformation that is surgical nature.  We have referred her to [facility], and she will check on that.  In the meantime I will continue her medications.

## 2024-08-23 NOTE — ASSESSMENT & PLAN NOTE
Pain not improving.  MRI done yesterday, but result not back yet.  Advised she could try walking, but pay attention to new pain or swelling.

## 2024-08-27 NOTE — RESULT ENCOUNTER NOTE
Severe arthritis. Two areas of meniscal tears. Swelling (effusion), inflammation (synovitis) and loose bodies seen in joint. The loose bodies could be parts of the meniscus that broke and became loose. A possible next step is a cortisone injection to reduce the swelling and inflammation, which might help the meniscus heal. Otherwise I could refer to ortho who could discuss possible surgery to repair/clean up the joint and menisci.

## 2024-08-30 ENCOUNTER — OFFICE VISIT (OUTPATIENT)
Dept: PRIMARY CARE CLINIC | Facility: CLINIC | Age: 54
End: 2024-08-30

## 2024-08-30 VITALS
OXYGEN SATURATION: 97 % | HEIGHT: 67 IN | WEIGHT: 249.6 LBS | HEART RATE: 72 BPM | BODY MASS INDEX: 39.18 KG/M2 | DIASTOLIC BLOOD PRESSURE: 90 MMHG | SYSTOLIC BLOOD PRESSURE: 145 MMHG | RESPIRATION RATE: 16 BRPM | TEMPERATURE: 98.5 F

## 2024-08-30 DIAGNOSIS — S83.206D TEAR OF MENISCUS OF RIGHT KNEE AS CURRENT INJURY, UNSPECIFIED MENISCUS, UNSPECIFIED TEAR TYPE, SUBSEQUENT ENCOUNTER: Primary | ICD-10-CM

## 2024-08-30 PROBLEM — S83.206A TEAR OF MENISCUS OF RIGHT KNEE AS CURRENT INJURY: Status: ACTIVE | Noted: 2024-08-30

## 2024-08-30 RX ORDER — TRIAMCINOLONE ACETONIDE 40 MG/ML
40 INJECTION, SUSPENSION INTRA-ARTICULAR; INTRAMUSCULAR ONCE
Status: COMPLETED | OUTPATIENT
Start: 2024-08-30 | End: 2024-08-30

## 2024-08-30 RX ADMIN — TRIAMCINOLONE ACETONIDE 40 MG: 40 INJECTION, SUSPENSION INTRA-ARTICULAR; INTRAMUSCULAR at 11:33

## 2024-08-30 NOTE — PROGRESS NOTES
daily      fluticasone (FLONASE) 50 MCG/ACT nasal spray 2 sprays by Nasal route daily      hydroCHLOROthiazide (HYDRODIURIL) 25 MG tablet Take 1 tablet by mouth daily      Naproxen Sodium (ALEVE) 220 MG CAPS Take 200 mg by mouth in the morning, at noon, and at bedtime      zinc 50 MG TABS tablet Take 1 tablet by mouth nightly      famotidine (PEPCID) 20 MG tablet Take 1 tablet by mouth in the morning and at bedtime      telmisartan (MICARDIS) 40 MG tablet Take 1 tablet by mouth at bedtime      atorvastatin (LIPITOR) 20 MG tablet Take 1 tablet by mouth nightly       No current facility-administered medications on file prior to visit.     No Known Allergies    Objective  Vitals:    08/30/24 0954   BP: (!) 145/90   Pulse: 72   Resp: 16   Temp: 98.5 °F (36.9 °C)   SpO2: 97%     Wt Readings from Last 3 Encounters:   08/30/24 113.2 kg (249 lb 9.6 oz)   08/23/24 108 kg (238 lb)   07/26/24 107.8 kg (237 lb 9.6 oz)     Physical Exam  Constitutional:       Appearance: Normal appearance.   HENT:      Head: Normocephalic and atraumatic.   Musculoskeletal:      Right knee: Swelling present. Tenderness present.   Neurological:      General: No focal deficit present.      Mental Status: She is alert and oriented to person, place, and time.   Psychiatric:         Mood and Affect: Mood normal.         Behavior: Behavior normal.          Assessment & Plan  1. Tear of meniscus of right knee as current injury, unspecified meniscus, unspecified tear type, subsequent encounter  Assessment & Plan:   Multiple menisci tears seen on MRI of right knee 8/22/2024.  Discussed options for treatment.  Patient would like to try steroid injection.    Procedure Documentation:  After discussion of the risks and benefits and obtaining verbal consent, the patient elected to proceed with a cortisone injection into right knee.  It was confirmed that the patient does not have history of prior adverse reactions, active infections, or relevant allergies.  There was no effusion, erythema, or warmth, and the skin was clear.    * Patient was identified by name and date of birth   * Agreement on procedure being performed was verified  * Risks and Benefits explained to the patient  * Procedure site verified and marked as necessary  * Patient was positioned for comfort  * Consent was signed and verified    Utilizing sterile technique, the skin was prepped.The sites were injected with a mixture of 40 mg of Kenalog and 1cc 1% lidocaine using a 25 gauge needle. The injection(s) was/were completed without complication, and a sterile bandage was applied. The patient tolerated the injection(s) well.  The patient will call immediately with any signs of infection or allergic reaction.     Time: current in chart  Date of procedure: 8/30/2024  Procedure performed by:  Reji Huerta MD     Aspects of this note have been generated using voice recognition software. Despite editing, there may be some syntax errors    Reji Huerta MD

## 2024-08-30 NOTE — ASSESSMENT & PLAN NOTE
Multiple menisci tears seen on MRI of right knee 8/22/2024.  Discussed options for treatment.  Patient would like to try steroid injection.

## 2024-09-07 ENCOUNTER — PATIENT MESSAGE (OUTPATIENT)
Dept: PRIMARY CARE CLINIC | Facility: CLINIC | Age: 54
End: 2024-09-07

## 2024-09-07 DIAGNOSIS — R05.1 ACUTE COUGH: Primary | ICD-10-CM

## 2024-09-09 RX ORDER — PREDNISONE 20 MG/1
20 TABLET ORAL 2 TIMES DAILY
Qty: 10 TABLET | Refills: 0 | Status: SHIPPED | OUTPATIENT
Start: 2024-09-09 | End: 2024-09-14

## 2024-09-12 ENCOUNTER — PATIENT MESSAGE (OUTPATIENT)
Dept: PRIMARY CARE CLINIC | Facility: CLINIC | Age: 54
End: 2024-09-12

## 2024-09-12 DIAGNOSIS — F32.A DEPRESSIVE DISORDER: Primary | ICD-10-CM

## 2024-09-13 RX ORDER — FLUOXETINE 40 MG/1
40 CAPSULE ORAL 2 TIMES DAILY
Qty: 180 CAPSULE | Refills: 1 | Status: SHIPPED | OUTPATIENT
Start: 2024-09-13

## 2024-09-16 ENCOUNTER — PATIENT MESSAGE (OUTPATIENT)
Dept: PRIMARY CARE CLINIC | Facility: CLINIC | Age: 54
End: 2024-09-16

## 2024-09-16 DIAGNOSIS — S83.206D TEAR OF MENISCUS OF RIGHT KNEE AS CURRENT INJURY, UNSPECIFIED MENISCUS, UNSPECIFIED TEAR TYPE, SUBSEQUENT ENCOUNTER: Primary | ICD-10-CM

## 2024-09-19 ENCOUNTER — PATIENT MESSAGE (OUTPATIENT)
Dept: PRIMARY CARE CLINIC | Facility: CLINIC | Age: 54
End: 2024-09-19

## 2024-09-19 DIAGNOSIS — G93.5 ARNOLD-CHIARI MALFORMATION, TYPE I (HCC): ICD-10-CM

## 2024-09-20 RX ORDER — OXYCODONE AND ACETAMINOPHEN 7.5; 325 MG/1; MG/1
1 TABLET ORAL EVERY 6 HOURS PRN
Qty: 120 TABLET | Refills: 0 | Status: SHIPPED | OUTPATIENT
Start: 2024-09-22 | End: 2024-10-22

## 2024-09-22 DIAGNOSIS — E11.65 TYPE 2 DIABETES MELLITUS WITH HYPERGLYCEMIA, WITHOUT LONG-TERM CURRENT USE OF INSULIN (HCC): ICD-10-CM

## 2024-09-22 DIAGNOSIS — E05.90 SUBCLINICAL HYPERTHYROIDISM: ICD-10-CM

## 2024-09-22 DIAGNOSIS — E78.00 HIGH CHOLESTEROL: ICD-10-CM

## 2024-09-23 ENCOUNTER — TELEPHONE (OUTPATIENT)
Dept: PRIMARY CARE CLINIC | Facility: CLINIC | Age: 54
End: 2024-09-23

## 2024-09-23 DIAGNOSIS — G93.5 ARNOLD-CHIARI MALFORMATION, TYPE I (HCC): ICD-10-CM

## 2024-09-23 RX ORDER — OXYCODONE AND ACETAMINOPHEN 7.5; 325 MG/1; MG/1
1 TABLET ORAL EVERY 6 HOURS PRN
Qty: 28 TABLET | Refills: 0 | Status: SHIPPED | OUTPATIENT
Start: 2024-09-23 | End: 2024-09-23 | Stop reason: SDUPTHER

## 2024-09-23 RX ORDER — OXYCODONE AND ACETAMINOPHEN 7.5; 325 MG/1; MG/1
1 TABLET ORAL EVERY 6 HOURS PRN
Qty: 28 TABLET | Refills: 0 | Status: SHIPPED | OUTPATIENT
Start: 2024-09-23 | End: 2024-09-30

## 2024-09-30 DIAGNOSIS — G93.5 ARNOLD-CHIARI MALFORMATION, TYPE I (HCC): ICD-10-CM

## 2024-10-10 ENCOUNTER — OFFICE VISIT (OUTPATIENT)
Dept: PRIMARY CARE CLINIC | Facility: CLINIC | Age: 54
End: 2024-10-10
Payer: OTHER GOVERNMENT

## 2024-10-10 VITALS
DIASTOLIC BLOOD PRESSURE: 87 MMHG | TEMPERATURE: 98.7 F | SYSTOLIC BLOOD PRESSURE: 159 MMHG | WEIGHT: 247.4 LBS | HEART RATE: 72 BPM | BODY MASS INDEX: 38.83 KG/M2 | HEIGHT: 67 IN

## 2024-10-10 DIAGNOSIS — S83.206D TEAR OF MENISCUS OF RIGHT KNEE AS CURRENT INJURY, UNSPECIFIED MENISCUS, UNSPECIFIED TEAR TYPE, SUBSEQUENT ENCOUNTER: ICD-10-CM

## 2024-10-10 DIAGNOSIS — F41.9 ANXIETY: Primary | ICD-10-CM

## 2024-10-10 PROCEDURE — 3079F DIAST BP 80-89 MM HG: CPT | Performed by: FAMILY MEDICINE

## 2024-10-10 PROCEDURE — 3077F SYST BP >= 140 MM HG: CPT | Performed by: FAMILY MEDICINE

## 2024-10-10 PROCEDURE — 99214 OFFICE O/P EST MOD 30 MIN: CPT | Performed by: FAMILY MEDICINE

## 2024-10-10 RX ORDER — BUSPIRONE HYDROCHLORIDE 10 MG/1
10 TABLET ORAL 2 TIMES DAILY PRN
Qty: 60 TABLET | Refills: 2 | Status: SHIPPED | OUTPATIENT
Start: 2024-10-10 | End: 2025-01-08

## 2024-10-10 NOTE — PROGRESS NOTES
Subjective  Chief Complaint   Patient presents with    Follow-up     Knee/ scalp     HPI:  Nikki Lozano is a 54 y.o. female.    Right knee meniscus tear-no improvement with steroid injection.  I had referred her to Ortho last month after [employer] message, however she has not heard from the office yet.    Anxiety-elevated with recent pain and death in the family.    Past Medical History:   Diagnosis Date    Allergies     Asthma     Depression     Diabetes (HCC)     GERD (gastroesophageal reflux disease)     Migraine      Current Outpatient Medications on File Prior to Visit   Medication Sig Dispense Refill    oxyCODONE-acetaminophen (PERCOCET) 7.5-325 MG per tablet Take 1 tablet by mouth every 6 hours as needed for Pain for up to 30 days. Max Daily Amount: 4 tablets 120 tablet 0    tiZANidine (ZANAFLEX) 4 MG tablet Take 1 tablet by mouth every 8 hours as needed (muscle spasm) 270 tablet 3    FLUoxetine (PROZAC) 40 MG capsule Take 1 capsule by mouth 2 times daily 180 capsule 1    amLODIPine (NORVASC) 10 MG tablet Take 1 tablet by mouth at bedtime 90 tablet 3    butalbital-acetaminophen-caffeine (FIORICET, ESGIC) -40 MG per tablet Take 2 tablets by mouth every 6 hours as needed for Headaches 180 tablet 3    glyBURIDE-metFORMIN (GLUCOVANCE) 5-500 MG per tablet Take 1 tablet by mouth 2 times daily (with meals) 180 tablet 1    CREON 05747-659621 units CPEP delayed release capsule Take by mouth      omeprazole (PRILOSEC) 20 MG delayed release capsule Take 1 capsule by mouth Daily      glucose monitoring kit 1 kit by Does not apply route daily Use twice daily glucose testing. E11.65 1 kit 0    blood glucose monitor strips Test 2 times a day & as needed for symptoms of irregular blood glucose. Dispense sufficient amount for indicated testing frequency plus additional to accommodate PRN testing needs. A1c 10.1 100 strip 3    Lancets MISC 1 each by Does not apply route 2 times daily 100 each 5    Ascorbic Acid (VITAMIN C)

## 2024-10-10 NOTE — ASSESSMENT & PLAN NOTE
Not as well-controlled due to increased stress.  Continue Prozac.  Will try buspirone in addition.

## 2024-10-10 NOTE — ASSESSMENT & PLAN NOTE
Multiple menisci tears seen on MRI of right knee 8/22/2024.  Improvement with cortisone injection.  Patient referred to Ortho last month, however she was not contacted.  I will have Yvonne, our referral specialist, work on getting her appointment up before she leaves.

## 2024-10-10 NOTE — PROGRESS NOTES
\"Have you been to the ER, urgent care clinic since your last visit?  Hospitalized since your last visit?\"    NO    “Have you seen or consulted any other health care providers outside of [facility] since your last visit?”    NO    “Have you had a colorectal cancer screening such as a colonoscopy/FIT/Cologuard?    NO    No colonoscopy on file  No cologuard on file  No FIT/FOBT on file   No flexible sigmoidoscopy on file         “Have you had a pap smear?”    NO    No cervical cancer screening on file

## 2024-10-16 ENCOUNTER — TELEPHONE (OUTPATIENT)
Dept: PRIMARY CARE CLINIC | Facility: CLINIC | Age: 54
End: 2024-10-16

## 2024-10-23 ENCOUNTER — HOSPITAL ENCOUNTER (EMERGENCY)
Facility: HOSPITAL | Age: 54
Discharge: HOME | End: 2024-10-24
Attending: STUDENT IN AN ORGANIZED HEALTH CARE EDUCATION/TRAINING PROGRAM
Payer: OTHER GOVERNMENT

## 2024-10-23 ENCOUNTER — APPOINTMENT (OUTPATIENT)
Facility: HOSPITAL | Age: 54
End: 2024-10-23
Payer: OTHER GOVERNMENT

## 2024-10-23 VITALS
TEMPERATURE: 98.8 F | OXYGEN SATURATION: 98 % | HEIGHT: 67 IN | HEART RATE: 76 BPM | DIASTOLIC BLOOD PRESSURE: 75 MMHG | SYSTOLIC BLOOD PRESSURE: 164 MMHG | WEIGHT: 245 LBS | RESPIRATION RATE: 16 BRPM | BODY MASS INDEX: 38.45 KG/M2

## 2024-10-23 DIAGNOSIS — R51.9 ACUTE NONINTRACTABLE HEADACHE, UNSPECIFIED HEADACHE TYPE: Primary | ICD-10-CM

## 2024-10-23 PROCEDURE — 99284 EMERGENCY DEPT VISIT MOD MDM: CPT

## 2024-10-23 PROCEDURE — 96374 THER/PROPH/DIAG INJ IV PUSH: CPT

## 2024-10-23 PROCEDURE — 70450 CT HEAD/BRAIN W/O DYE: CPT

## 2024-10-23 PROCEDURE — 85025 COMPLETE CBC W/AUTO DIFF WBC: CPT

## 2024-10-23 PROCEDURE — 96375 TX/PRO/DX INJ NEW DRUG ADDON: CPT

## 2024-10-23 PROCEDURE — 36415 COLL VENOUS BLD VENIPUNCTURE: CPT

## 2024-10-23 PROCEDURE — 6360000002 HC RX W HCPCS: Performed by: STUDENT IN AN ORGANIZED HEALTH CARE EDUCATION/TRAINING PROGRAM

## 2024-10-23 PROCEDURE — 80053 COMPREHEN METABOLIC PANEL: CPT

## 2024-10-23 RX ORDER — KETOROLAC TROMETHAMINE 30 MG/ML
30 INJECTION, SOLUTION INTRAMUSCULAR; INTRAVENOUS
Status: COMPLETED | OUTPATIENT
Start: 2024-10-23 | End: 2024-10-23

## 2024-10-23 RX ORDER — DIPHENHYDRAMINE HYDROCHLORIDE 50 MG/ML
50 INJECTION INTRAMUSCULAR; INTRAVENOUS
Status: COMPLETED | OUTPATIENT
Start: 2024-10-23 | End: 2024-10-23

## 2024-10-23 RX ORDER — PROCHLORPERAZINE EDISYLATE 5 MG/ML
10 INJECTION INTRAMUSCULAR; INTRAVENOUS
Status: COMPLETED | OUTPATIENT
Start: 2024-10-23 | End: 2024-10-23

## 2024-10-23 RX ADMIN — DIPHENHYDRAMINE HYDROCHLORIDE 50 MG: 50 INJECTION INTRAMUSCULAR; INTRAVENOUS at 23:57

## 2024-10-23 RX ADMIN — KETOROLAC TROMETHAMINE 30 MG: 30 INJECTION, SOLUTION INTRAMUSCULAR at 23:55

## 2024-10-23 RX ADMIN — PROCHLORPERAZINE EDISYLATE 10 MG: 5 INJECTION INTRAMUSCULAR; INTRAVENOUS at 23:59

## 2024-10-23 ASSESSMENT — PAIN SCALES - GENERAL: PAINLEVEL_OUTOF10: 8

## 2024-10-23 ASSESSMENT — PAIN DESCRIPTION - LOCATION: LOCATION: HEAD

## 2024-10-23 ASSESSMENT — PAIN - FUNCTIONAL ASSESSMENT: PAIN_FUNCTIONAL_ASSESSMENT: 0-10

## 2024-10-23 ASSESSMENT — PAIN DESCRIPTION - DESCRIPTORS: DESCRIPTORS: THROBBING

## 2024-10-24 LAB
ALBUMIN SERPL-MCNC: 4.2 G/DL (ref 3.5–5.2)
ALBUMIN/GLOB SERPL: 1.4 (ref 1.1–2.2)
ALP SERPL-CCNC: 104 U/L (ref 35–104)
ALT SERPL-CCNC: 14 U/L (ref 10–35)
ANION GAP SERPL CALC-SCNC: 12 MMOL/L (ref 2–12)
AST SERPL-CCNC: 12 U/L (ref 10–35)
BASOPHILS # BLD: 0.1 K/UL (ref 0–1)
BASOPHILS NFR BLD: 1 % (ref 0–1)
BILIRUB SERPL-MCNC: <0.2 MG/DL (ref 0.2–1)
BUN SERPL-MCNC: 12 MG/DL (ref 6–20)
BUN/CREAT SERPL: 16 (ref 12–20)
CALCIUM SERPL-MCNC: 9.3 MG/DL (ref 8.6–10)
CHLORIDE SERPL-SCNC: 99 MMOL/L (ref 98–107)
CO2 SERPL-SCNC: 27 MMOL/L (ref 22–29)
CREAT SERPL-MCNC: 0.73 MG/DL (ref 0.5–0.9)
DIFFERENTIAL METHOD BLD: ABNORMAL
EOSINOPHIL # BLD: 0.1 K/UL (ref 0–0.4)
EOSINOPHIL NFR BLD: 2 %
ERYTHROCYTE [DISTWIDTH] IN BLOOD BY AUTOMATED COUNT: 13.2 % (ref 11.5–14.5)
GLOBULIN SER CALC-MCNC: 3.1 G/DL (ref 2–4)
GLUCOSE SERPL-MCNC: 127 MG/DL (ref 65–100)
HCT VFR BLD AUTO: 38.9 % (ref 35–47)
HGB BLD-MCNC: 13 G/DL (ref 11.5–16)
IMM GRANULOCYTES # BLD AUTO: 0 K/UL (ref 0–0.04)
IMM GRANULOCYTES NFR BLD AUTO: 0 % (ref 0–0.5)
LYMPHOCYTES # BLD: 2.7 K/UL (ref 0.8–3.5)
LYMPHOCYTES NFR BLD: 34 % (ref 12–49)
MCH RBC QN AUTO: 29.3 PG (ref 26–34)
MCHC RBC AUTO-ENTMCNC: 33.4 G/DL (ref 30–36.5)
MCV RBC AUTO: 87.6 FL (ref 80–99)
MONOCYTES # BLD: 0.7 K/UL (ref 0–1)
MONOCYTES NFR BLD: 9 % (ref 5–13)
NEUTS SEG # BLD: 4.2 K/UL (ref 1.8–8)
NEUTS SEG NFR BLD: 54 % (ref 32–75)
NRBC # BLD: 0 K/UL (ref 0–0.01)
NRBC BLD-RTO: 0 PER 100 WBC
PLATELET # BLD AUTO: 266 K/UL (ref 150–400)
PMV BLD AUTO: 13 FL (ref 8.9–12.9)
POTASSIUM SERPL-SCNC: 3.3 MMOL/L (ref 3.5–5.1)
PROT SERPL-MCNC: 7.3 G/DL (ref 6.4–8.3)
RBC # BLD AUTO: 4.44 M/UL (ref 3.8–5.2)
SODIUM SERPL-SCNC: 138 MMOL/L (ref 136–145)
WBC # BLD AUTO: 7.7 K/UL (ref 3.6–11)

## 2024-10-24 NOTE — ED PROVIDER NOTES
[facility] EMERGENCY DEPT  EMERGENCY DEPARTMENT ENCOUNTER      Pt Name: Nikki Lozano  MRN: [id number]  Birthdate 1970  Date of evaluation: 10/23/2024  Provider: Marv Mcconnell DO    CHIEF COMPLAINT       Chief Complaint   Patient presents with    Headache         HISTORY OF PRESENT ILLNESS   (Location/Symptom, Timing/Onset, Context/Setting, Quality, Duration, Modifying Factors, Severity)  Note limiting factors.   This is a 54-year-old female presents to the ED for evaluation of a headache.  Patient has a history of migraines, takes Fioricet, has a history of Chiari malformation.  Last imaging of her head was performed 3 years ago.  Reports that this headache started in the frontal region of her head when her migraines usually start in the occipital region.  She denies any vision changes hearing changes weakness numbness or slurred speech.             Review of External Medical Records:     Nursing Notes were reviewed.    REVIEW OF SYSTEMS    (2-9 systems for level 4, 10 or more for level 5)     Review of Systems   Constitutional:  Negative for fever.   Musculoskeletal:  Negative for neck pain.   Neurological:  Positive for headaches. Negative for dizziness, facial asymmetry, speech difficulty, weakness and numbness.       Except as noted above the remainder of the review of systems was reviewed and negative.       PAST MEDICAL HISTORY     Past Medical History:   Diagnosis Date    Allergies     Asthma     Depression     Diabetes (HCC)     GERD (gastroesophageal reflux disease)     Migraine          SURGICAL HISTORY       Past Surgical History:   Procedure Laterality Date    ANKLE SURGERY  1993 1993, 1994, 2022    BREAST REDUCTION SURGERY      CHOLECYSTECTOMY  2008    DILATION AND CURETTAGE OF UTERUS      HERNIA REPAIR      2008    UTERUS SURGERY  2014         CURRENT MEDICATIONS       Discharge Medication List as of 10/24/2024  1:14 AM        CONTINUE these medications which have NOT CHANGED    Details

## 2024-10-24 NOTE — ED TRIAGE NOTES
Patient arrived to the ER with complaints of headache. Patient states that she woke with the headache this morning and her normal medication is not helping with the pain. Patient states that she normally takes Fioricet for her migraines but today she has taken her max dose with no relief. Patient denies loc or vision changes but states that she gets dizzy when changing positions.

## 2024-11-04 ENCOUNTER — TELEPHONE (OUTPATIENT)
Dept: PRIMARY CARE CLINIC | Facility: CLINIC | Age: 54
End: 2024-11-04

## 2024-11-04 DIAGNOSIS — G93.5 ARNOLD-CHIARI MALFORMATION, TYPE I (HCC): ICD-10-CM

## 2024-11-04 RX ORDER — OXYCODONE AND ACETAMINOPHEN 7.5; 325 MG/1; MG/1
1 TABLET ORAL EVERY 6 HOURS PRN
Qty: 120 TABLET | Refills: 0 | Status: SHIPPED | OUTPATIENT
Start: 2024-11-04 | End: 2024-12-04

## 2024-11-04 NOTE — TELEPHONE ENCOUNTER
Pt ([phone]) advised that she would like to request a refill of the following medication:     Oxycodone-acetaminophen 7.5-325 MG per tablet    [facility] sent to:   [id number] [place] [address] - P [phone] - F [phone]     Please assist with this matter.     VLT - PSR (Float Pool)

## 2024-11-23 LAB
ALBUMIN SERPL-MCNC: 4.3 G/DL (ref 3.8–4.9)
ALP SERPL-CCNC: 130 IU/L (ref 44–121)
ALT SERPL-CCNC: 12 IU/L (ref 0–32)
AST SERPL-CCNC: 11 IU/L (ref 0–40)
BILIRUB SERPL-MCNC: 0.2 MG/DL (ref 0–1.2)
BUN SERPL-MCNC: 12 MG/DL (ref 6–24)
BUN/CREAT SERPL: 14 (ref 9–23)
CALCIUM SERPL-MCNC: 10 MG/DL (ref 8.7–10.2)
CHLORIDE SERPL-SCNC: 98 MMOL/L (ref 96–106)
CHOLEST SERPL-MCNC: 174 MG/DL (ref 100–199)
CO2 SERPL-SCNC: 26 MMOL/L (ref 20–29)
CREAT SERPL-MCNC: 0.85 MG/DL (ref 0.57–1)
EGFRCR SERPLBLD CKD-EPI 2021: 81 ML/MIN/1.73
GLOBULIN SER CALC-MCNC: 3 G/DL (ref 1.5–4.5)
GLUCOSE SERPL-MCNC: 125 MG/DL (ref 70–99)
HBA1C MFR BLD: 7.1 % (ref 4.8–5.6)
HDLC SERPL-MCNC: 47 MG/DL
LDLC SERPL CALC-MCNC: 87 MG/DL (ref 0–99)
POTASSIUM SERPL-SCNC: 4 MMOL/L (ref 3.5–5.2)
PROT SERPL-MCNC: 7.3 G/DL (ref 6–8.5)
SODIUM SERPL-SCNC: 139 MMOL/L (ref 134–144)
TRIGL SERPL-MCNC: 239 MG/DL (ref 0–149)
TSH SERPL DL<=0.005 MIU/L-ACNC: 0.49 UIU/ML (ref 0.45–4.5)
VLDLC SERPL CALC-MCNC: 40 MG/DL (ref 5–40)

## 2024-11-25 ENCOUNTER — OFFICE VISIT (OUTPATIENT)
Age: 54
End: 2024-11-25
Payer: OTHER GOVERNMENT

## 2024-11-25 DIAGNOSIS — F41.9 ANXIETY: ICD-10-CM

## 2024-11-25 DIAGNOSIS — E11.65 TYPE 2 DIABETES MELLITUS WITH HYPERGLYCEMIA, WITHOUT LONG-TERM CURRENT USE OF INSULIN (HCC): Primary | ICD-10-CM

## 2024-11-25 PROCEDURE — G0108 DIAB MANAGE TRN  PER INDIV: HCPCS | Performed by: DIETITIAN, REGISTERED

## 2024-11-25 NOTE — PROGRESS NOTES
[facility]  Diabetes Self-Management Education & Support Program  Post-program Evaluation    EVALUATION @ COMPLETION OF THE PROGRAM    Nikki Lozano completed  9 hours  of diabetes self-management education. The participant acquired new knowledge and demonstrated new skills during the program.     The participant worked on the following SMART GOAL(s) to improve their diabetes self-management:    Make appointment for annual diabetic eye exam   ACHIEVEMENT OF GOAL(S)  [x] 0-24%     [] 25-49%     [] 50-74%     [] %  2.   Check blood glucose 1 times per day over the next week.- fasting blood glucose   ACHIEVEMENT OF GOAL(S)  [] 0-24%     [] 25-49%     [] 50-74%     [x] %  3.   Practice building a balanced meal for lunch and dinner at least 3 times over the next week.  ACHIEVEMENT OF GOAL(S)  [] 0-24%     [] 25-49%     [] 50-74%     [x] %    The participant's pre-program A1c was No results found for: 10.4%.. The post-evaluation A1c is 7.1%.    The participant improved their Diabetes Skills, Confidence and Preparedness Index (scored out of 7):    Total score: 5.6    Skills: 6.7    Confidence: 6.0    Preparedness: 5.3      FINAL RECOMMENDATIONS:  Notify PCP of elevated BP (using MyChart, she made an appointment)  Calcium supplement  (she reports recent diagnosis c dairy allergy)  Follow up c DSMES if needed; she has 1 hour of FLU now through June 2025      Next provider visit is scheduled for 11/26/2025 with Dr. Huerta (she made the appointment during this visit)  During our visit, Ms. Lozano c/o headache and asked if her BP could be checked. An available RN took her BP. It was 161/91. She was strongly encouraged to notify her PCP. Ms. Lozano verbally acknowledged this recommendation and said, \"I understand\".     Shanelle Soto RD on 11/25/2024 at 2:03 PM    Metric Patient's responses (11/25/2024) Areas for improvement     Healthy Eating       The participant is using the

## 2024-11-26 ENCOUNTER — OFFICE VISIT (OUTPATIENT)
Dept: PRIMARY CARE CLINIC | Facility: CLINIC | Age: 54
End: 2024-11-26
Payer: OTHER GOVERNMENT

## 2024-11-26 VITALS
SYSTOLIC BLOOD PRESSURE: 160 MMHG | TEMPERATURE: 98.6 F | HEART RATE: 72 BPM | HEIGHT: 67 IN | WEIGHT: 247 LBS | DIASTOLIC BLOOD PRESSURE: 89 MMHG | OXYGEN SATURATION: 99 % | BODY MASS INDEX: 38.77 KG/M2

## 2024-11-26 DIAGNOSIS — G93.5 ARNOLD-CHIARI MALFORMATION, TYPE I (HCC): ICD-10-CM

## 2024-11-26 DIAGNOSIS — I10 ESSENTIAL HYPERTENSION: Primary | ICD-10-CM

## 2024-11-26 PROCEDURE — 99214 OFFICE O/P EST MOD 30 MIN: CPT | Performed by: FAMILY MEDICINE

## 2024-11-26 PROCEDURE — 3077F SYST BP >= 140 MM HG: CPT | Performed by: FAMILY MEDICINE

## 2024-11-26 PROCEDURE — 3079F DIAST BP 80-89 MM HG: CPT | Performed by: FAMILY MEDICINE

## 2024-11-26 RX ORDER — OXYCODONE AND ACETAMINOPHEN 7.5; 325 MG/1; MG/1
1 TABLET ORAL EVERY 6 HOURS PRN
Qty: 120 TABLET | Refills: 0 | Status: SHIPPED | OUTPATIENT
Start: 2024-11-26 | End: 2024-12-26

## 2024-11-26 RX ORDER — TELMISARTAN 80 MG/1
80 TABLET ORAL NIGHTLY
Qty: 90 TABLET | Refills: 1 | Status: SHIPPED | OUTPATIENT
Start: 2024-11-26 | End: 2024-11-29 | Stop reason: SDUPTHER

## 2024-11-26 NOTE — ASSESSMENT & PLAN NOTE
Elevated secondary to inactivity from knee injury. Causing increased headaches. Will increase telmisartan to 80mg.  Patient will check blood pressure at home.  If continues to be high.  She will come back in to discuss starting additional medication, likely beta-blocker

## 2024-11-26 NOTE — PROGRESS NOTES
\"Have you been to the ER, urgent care clinic since your last visit?  Hospitalized since your last visit?\"    YES - When: approximately 1 months ago.  Where and Why: [facility] due to a migraine.    “Have you seen or consulted any other health care providers outside our system since your last visit?”    NO     “Have you had a pap smear?”    YES - Where: [facility] in January, 2024. Nurse/CMA to request most recent records if not in the chart    No cervical cancer screening on file       “Have you had a colorectal cancer screening such as a colonoscopy/FIT/Cologuard?    NO    No colonoscopy on file  No cologuard on file  No FIT/FOBT on file   No flexible sigmoidoscopy on file     “Have you had a diabetic eye exam?”    NO     No diabetic eye exam on file          
beta-blocker  Orders:  -     telmisartan (MICARDIS) 80 MG tablet; Take 1 tablet by mouth at bedtime, Disp-90 tablet, R-1Normal  2. Arnold-Chiari malformation, type I (HCC)  Assessment & Plan:  Chronic pain decently controlled on current medications.  Will refill today.  Orders:  -     oxyCODONE-acetaminophen (PERCOCET) 7.5-325 MG per tablet; Take 1 tablet by mouth every 6 hours as needed for Pain for up to 30 days. Max Daily Amount: 4 tablets, Disp-120 tablet, R-0Normal        Aspects of this note have been generated using voice recognition software. Despite editing, there may be some syntax errors  Follow-up and Dispositions    Return in about 3 months (around 2/26/2025).       Reji Huerta MD

## 2024-11-29 DIAGNOSIS — I10 ESSENTIAL HYPERTENSION: ICD-10-CM

## 2024-11-29 RX ORDER — TELMISARTAN 80 MG/1
80 TABLET ORAL NIGHTLY
Qty: 90 TABLET | Refills: 1 | Status: SHIPPED | OUTPATIENT
Start: 2024-11-29

## 2024-11-29 RX ORDER — HYDROCHLOROTHIAZIDE 25 MG/1
25 TABLET ORAL DAILY
Qty: 90 TABLET | Refills: 1 | Status: SHIPPED | OUTPATIENT
Start: 2024-11-29

## 2024-11-29 NOTE — TELEPHONE ENCOUNTER
Patient called in stating she was seen on Tuesday in regards to BP but patient did not realize she was out of the Micardis and Hydrochlorothiazide. Patient said she would [facility] but if they can also [facility] [address] For a 2 week supply because [facility] sometimes takes time to get it.

## 2024-12-23 ENCOUNTER — TELEPHONE (OUTPATIENT)
Dept: PRIMARY CARE CLINIC | Facility: CLINIC | Age: 54
End: 2024-12-23

## 2024-12-23 DIAGNOSIS — G93.5 ARNOLD-CHIARI MALFORMATION, TYPE I (HCC): ICD-10-CM

## 2024-12-23 RX ORDER — OXYCODONE AND ACETAMINOPHEN 7.5; 325 MG/1; MG/1
1 TABLET ORAL EVERY 6 HOURS PRN
Qty: 120 TABLET | Refills: 0 | Status: SHIPPED | OUTPATIENT
Start: 2024-12-23 | End: 2025-01-22

## 2024-12-23 NOTE — TELEPHONE ENCOUNTER
Pt called in requesting medication refill for:    oxyCODONE-acetaminophen (PERCOCET) 7.5-325 MG per tablet [[id number]]    Order Details  Dose: 1 tablet Route: Oral Frequency: EVERY 6 HOURS PRN for Pain   Dispense Quantity: 120 tablet Refills: 0    Note to Pharmacy: Reduce doses taken as pain becomes manageable         Sig: Take 1 tablet by mouth every 6 hours as needed for Pain for up to 30 days. Max Daily Amount: 4 tablets         Start Date: 11/26/24 End Date: 12/26/24   Written Date: 11/26/24 Expiration Date: 01/25/25   Earliest Fill Date: 11/26/24         Associated Diagnoses: Arnold-Chiari malformation, type I (HCC) [G93.5]   Original Order: oxyCODONE-acetaminophen (PERCOCET) 7.5-325 MG per tablet [[id number]]

## 2024-12-30 DIAGNOSIS — E11.65 TYPE 2 DIABETES MELLITUS WITH HYPERGLYCEMIA, WITHOUT LONG-TERM CURRENT USE OF INSULIN (HCC): ICD-10-CM

## 2024-12-31 ENCOUNTER — OFFICE VISIT (OUTPATIENT)
Dept: PRIMARY CARE CLINIC | Facility: CLINIC | Age: 54
End: 2024-12-31
Payer: OTHER GOVERNMENT

## 2024-12-31 VITALS
TEMPERATURE: 98.7 F | DIASTOLIC BLOOD PRESSURE: 73 MMHG | SYSTOLIC BLOOD PRESSURE: 143 MMHG | HEART RATE: 77 BPM | WEIGHT: 258.4 LBS | HEIGHT: 67 IN | BODY MASS INDEX: 40.56 KG/M2

## 2024-12-31 DIAGNOSIS — M25.561 ACUTE PAIN OF RIGHT KNEE: Primary | ICD-10-CM

## 2024-12-31 DIAGNOSIS — M65.4 DE QUERVAIN'S DISEASE (TENOSYNOVITIS): ICD-10-CM

## 2024-12-31 PROCEDURE — 20605 DRAIN/INJ JOINT/BURSA W/O US: CPT | Performed by: FAMILY MEDICINE

## 2024-12-31 PROCEDURE — 3078F DIAST BP <80 MM HG: CPT | Performed by: FAMILY MEDICINE

## 2024-12-31 PROCEDURE — 3077F SYST BP >= 140 MM HG: CPT | Performed by: FAMILY MEDICINE

## 2024-12-31 PROCEDURE — 99213 OFFICE O/P EST LOW 20 MIN: CPT | Performed by: FAMILY MEDICINE

## 2024-12-31 RX ORDER — GLYBURIDE-METFORMIN HYDROCHLORIDE 5; 500 MG/1; MG/1
1 TABLET ORAL 2 TIMES DAILY WITH MEALS
Qty: 180 TABLET | Refills: 3 | Status: SHIPPED | OUTPATIENT
Start: 2024-12-31

## 2024-12-31 RX ORDER — TRIAMCINOLONE ACETONIDE 40 MG/ML
20 INJECTION, SUSPENSION INTRA-ARTICULAR; INTRAMUSCULAR ONCE
Status: COMPLETED | OUTPATIENT
Start: 2024-12-31 | End: 2024-12-31

## 2024-12-31 RX ORDER — DICLOFENAC SODIUM 75 MG/1
75 TABLET, DELAYED RELEASE ORAL 2 TIMES DAILY
COMMUNITY
Start: 2024-11-26 | End: 2024-12-31 | Stop reason: SDUPTHER

## 2024-12-31 RX ORDER — DICLOFENAC SODIUM 75 MG/1
75 TABLET, DELAYED RELEASE ORAL 2 TIMES DAILY
Qty: 180 TABLET | Refills: 1 | Status: SHIPPED | OUTPATIENT
Start: 2024-12-31

## 2024-12-31 RX ADMIN — TRIAMCINOLONE ACETONIDE 20 MG: 40 INJECTION, SUSPENSION INTRA-ARTICULAR; INTRAMUSCULAR at 14:45

## 2024-12-31 NOTE — PROGRESS NOTES
Subjective  Chief Complaint   Patient presents with    Other     Right wrist     HPI:  Nikki Lozano is a 54 y.o. female.    Right knee pain-Following with Ortho. Reports some improvement with diclofenac and would like to continue it. Starting physical therapy. May need knee replacement.     Right de Quervain's tenosynovitis-patient request repeat injection.  She reports it has flared a bit recently secondary to crocheting.    Past Medical History:   Diagnosis Date    Allergic rhinitis     Allergies     Asthma     COPD (chronic obstructive pulmonary disease) ([facility])     Depression     Diabetes ([facility])     GERD (gastroesophageal reflux disease)     Hypertension     Migraine     Obesity     Type 2 diabetes mellitus without complication ([facility])      Current Outpatient Medications on File Prior to Visit   Medication Sig Dispense Refill    glyBURIDE-metFORMIN (GLUCOVANCE) 5-500 MG per tablet TAKE 1 TABLET TWICE A DAY WITH MEALS 180 tablet 3    oxyCODONE-acetaminophen (PERCOCET) 7.5-325 MG per tablet Take 1 tablet by mouth every 6 hours as needed for Pain for up to 30 days. Max Daily Amount: 4 tablets 120 tablet 0    hydroCHLOROthiazide (HYDRODIURIL) 25 MG tablet Take 1 tablet by mouth daily 90 tablet 1    telmisartan (MICARDIS) 80 MG tablet Take 1 tablet by mouth at bedtime 90 tablet 1    tiZANidine (ZANAFLEX) 4 MG tablet Take 1 tablet by mouth every 8 hours as needed (muscle spasm) 270 tablet 3    FLUoxetine (PROZAC) 40 MG capsule Take 1 capsule by mouth 2 times daily 180 capsule 1    amLODIPine (NORVASC) 10 MG tablet Take 1 tablet by mouth at bedtime 90 tablet 3    butalbital-acetaminophen-caffeine (FIORICET, ESGIC) -40 MG per tablet Take 2 tablets by mouth every 6 hours as needed for Headaches 180 tablet 3    CREON 66125-304749 units CPEP delayed release capsule Take by mouth      omeprazole (PRILOSEC) 20 MG delayed release capsule Take 1 capsule by mouth Daily      glucose monitoring kit 1 kit by Does not apply route

## 2024-12-31 NOTE — ASSESSMENT & PLAN NOTE
Following with Ortho.  Reports some improvement with diclofenac and would like to continue it.  Starting physical therapy.  May need knee replacement.

## 2025-01-02 DIAGNOSIS — E11.65 TYPE 2 DIABETES MELLITUS WITH HYPERGLYCEMIA, WITHOUT LONG-TERM CURRENT USE OF INSULIN (HCC): ICD-10-CM

## 2025-01-02 RX ORDER — BLOOD-GLUCOSE METER
1 KIT MISCELLANEOUS 2 TIMES DAILY
Qty: 100 STRIP | Refills: 5 | Status: SHIPPED | OUTPATIENT
Start: 2025-01-02

## 2025-01-04 DIAGNOSIS — G93.5 ARNOLD-CHIARI MALFORMATION, TYPE I (HCC): ICD-10-CM

## 2025-01-06 RX ORDER — BUTALBITAL, ACETAMINOPHEN AND CAFFEINE 50; 325; 40 MG/1; MG/1; MG/1
TABLET ORAL
Qty: 180 TABLET | Refills: 14 | Status: SHIPPED | OUTPATIENT
Start: 2025-01-06

## 2025-01-17 ENCOUNTER — OFFICE VISIT (OUTPATIENT)
Dept: PRIMARY CARE CLINIC | Facility: CLINIC | Age: 55
End: 2025-01-17
Payer: OTHER GOVERNMENT

## 2025-01-17 VITALS
SYSTOLIC BLOOD PRESSURE: 142 MMHG | HEART RATE: 86 BPM | BODY MASS INDEX: 38.71 KG/M2 | TEMPERATURE: 98.6 F | DIASTOLIC BLOOD PRESSURE: 86 MMHG | HEIGHT: 67 IN | WEIGHT: 246.6 LBS | OXYGEN SATURATION: 98 % | RESPIRATION RATE: 16 BRPM

## 2025-01-17 DIAGNOSIS — J06.9 UPPER RESPIRATORY TRACT INFECTION, UNSPECIFIED TYPE: Primary | ICD-10-CM

## 2025-01-17 DIAGNOSIS — G93.5 ARNOLD-CHIARI MALFORMATION, TYPE I (HCC): ICD-10-CM

## 2025-01-17 PROCEDURE — 3077F SYST BP >= 140 MM HG: CPT | Performed by: FAMILY MEDICINE

## 2025-01-17 PROCEDURE — 99213 OFFICE O/P EST LOW 20 MIN: CPT | Performed by: FAMILY MEDICINE

## 2025-01-17 PROCEDURE — 3079F DIAST BP 80-89 MM HG: CPT | Performed by: FAMILY MEDICINE

## 2025-01-17 SDOH — ECONOMIC STABILITY: FOOD INSECURITY: WITHIN THE PAST 12 MONTHS, YOU WORRIED THAT YOUR FOOD WOULD RUN OUT BEFORE YOU GOT MONEY TO BUY MORE.: NEVER TRUE

## 2025-01-17 SDOH — ECONOMIC STABILITY: FOOD INSECURITY: WITHIN THE PAST 12 MONTHS, THE FOOD YOU BOUGHT JUST DIDN'T LAST AND YOU DIDN'T HAVE MONEY TO GET MORE.: NEVER TRUE

## 2025-01-17 ASSESSMENT — PATIENT HEALTH QUESTIONNAIRE - PHQ9
1. LITTLE INTEREST OR PLEASURE IN DOING THINGS: SEVERAL DAYS
4. FEELING TIRED OR HAVING LITTLE ENERGY: SEVERAL DAYS
9. THOUGHTS THAT YOU WOULD BE BETTER OFF DEAD, OR OF HURTING YOURSELF: SEVERAL DAYS
SUM OF ALL RESPONSES TO PHQ9 QUESTIONS 1 & 2: 2
6. FEELING BAD ABOUT YOURSELF - OR THAT YOU ARE A FAILURE OR HAVE LET YOURSELF OR YOUR FAMILY DOWN: SEVERAL DAYS
3. TROUBLE FALLING OR STAYING ASLEEP: SEVERAL DAYS
SUM OF ALL RESPONSES TO PHQ QUESTIONS 1-9: 9
SUM OF ALL RESPONSES TO PHQ QUESTIONS 1-9: 9
7. TROUBLE CONCENTRATING ON THINGS, SUCH AS READING THE NEWSPAPER OR WATCHING TELEVISION: SEVERAL DAYS
SUM OF ALL RESPONSES TO PHQ QUESTIONS 1-9: 9
SUM OF ALL RESPONSES TO PHQ QUESTIONS 1-9: 8
5. POOR APPETITE OR OVEREATING: SEVERAL DAYS
10. IF YOU CHECKED OFF ANY PROBLEMS, HOW DIFFICULT HAVE THESE PROBLEMS MADE IT FOR YOU TO DO YOUR WORK, TAKE CARE OF THINGS AT HOME, OR GET ALONG WITH OTHER PEOPLE: SOMEWHAT DIFFICULT
8. MOVING OR SPEAKING SO SLOWLY THAT OTHER PEOPLE COULD HAVE NOTICED. OR THE OPPOSITE, BEING SO FIGETY OR RESTLESS THAT YOU HAVE BEEN MOVING AROUND A LOT MORE THAN USUAL: SEVERAL DAYS
2. FEELING DOWN, DEPRESSED OR HOPELESS: SEVERAL DAYS

## 2025-01-17 ASSESSMENT — COLUMBIA-SUICIDE SEVERITY RATING SCALE - C-SSRS
1. WITHIN THE PAST MONTH, HAVE YOU WISHED YOU WERE DEAD OR WISHED YOU COULD GO TO SLEEP AND NOT WAKE UP?: NO
6. HAVE YOU EVER DONE ANYTHING, STARTED TO DO ANYTHING, OR PREPARED TO DO ANYTHING TO END YOUR LIFE?: NO
2. HAVE YOU ACTUALLY HAD ANY THOUGHTS OF KILLING YOURSELF?: NO

## 2025-01-17 NOTE — ASSESSMENT & PLAN NOTE
Patient reports worsening pain. Will increased tizanidine to max of 8mg TID. Gave number to reach out to neurology for scheduling for evaluation and treatment.

## 2025-01-17 NOTE — PROGRESS NOTES
Subjective  Chief Complaint   Patient presents with    Other     Right Ear, right side of throat     HPI:  Nikki Lozano is a 54 y.o. female who presents to the clinic complaining of right ear pain, and pain on her neck under her right ear. The patient states that she has been experiencing these symptoms for the last two weeks. She said that her symptoms began shortly after she had a dental cleaning. The patient reports that she had developed a a ulcer on the side of her tongue. The patient saw her dentist for this issue who said it was caused by contact of her tongue with her tooth.  The patient denies a fever, chills, SOB, wheezing, or any other consitutional symptoms at this time.         Past Medical History:   Diagnosis Date    Allergic rhinitis     Allergies     Asthma     COPD (chronic obstructive pulmonary disease) ([facility])     Depression     Diabetes ([facility])     GERD (gastroesophageal reflux disease)     Hypertension     Migraine     Obesity     Type 2 diabetes mellitus without complication ([facility])      Current Outpatient Medications on File Prior to Visit   Medication Sig Dispense Refill    butalbital-acetaminophen-caffeine (FIORICET, ESGIC) -40 MG per tablet TAKE 2 TABLETS EVERY 6 HOURS AS NEEDED FOR HEADACHES 180 tablet 14    blood glucose test strips (FREESTYLE LITE) strip Inject 1 each into the skin 2 times daily 100 strip 5    glyBURIDE-metFORMIN (GLUCOVANCE) 5-500 MG per tablet TAKE 1 TABLET TWICE A DAY WITH MEALS 180 tablet 3    diclofenac (VOLTAREN) 75 MG EC tablet Take 1 tablet by mouth 2 times daily 180 tablet 1    oxyCODONE-acetaminophen (PERCOCET) 7.5-325 MG per tablet Take 1 tablet by mouth every 6 hours as needed for Pain for up to 30 days. Max Daily Amount: 4 tablets 120 tablet 0    hydroCHLOROthiazide (HYDRODIURIL) 25 MG tablet Take 1 tablet by mouth daily 90 tablet 1    telmisartan (MICARDIS) 80 MG tablet Take 1 tablet by mouth at bedtime 90 tablet 1    tiZANidine (ZANAFLEX) 4 MG tablet Take 
\"Have you been to the ER, urgent care clinic since your last visit?  Hospitalized since your last visit?\"    NO    “Have you seen or consulted any other health care providers outside our system since your last visit?”    NO     “Have you had a pap smear?”    NO    No cervical cancer screening on file       “Have you had a colorectal cancer screening such as a colonoscopy/FIT/Cologuard?    NO    No colonoscopy on file  No cologuard on file  No FIT/FOBT on file   No flexible sigmoidoscopy on file     “Have you had a diabetic eye exam?”    NO     No diabetic eye exam on file          
delayed release capsule Take 1 capsule by mouth Daily      glucose monitoring kit 1 kit by Does not apply route daily Use twice daily glucose testing. E11.65 1 kit 0    Lancets MISC 1 each by Does not apply route 2 times daily 100 each 5    Ascorbic Acid (VITAMIN C) 1000 MG tablet Take 1 tablet by mouth daily      fluticasone (FLONASE) 50 MCG/ACT nasal spray 2 sprays by Nasal route daily      zinc 50 MG TABS tablet Take 1 tablet by mouth nightly      famotidine (PEPCID) 20 MG tablet Take 1 tablet by mouth in the morning and at bedtime      atorvastatin (LIPITOR) 20 MG tablet Take 1 tablet by mouth nightly       No current facility-administered medications on file prior to visit.     No Known Allergies    Objective  Vitals:    01/17/25 1304   BP: (!) 142/86   Pulse: 86   Resp: 16   Temp: 98.6 °F (37 °C)   SpO2: 98%     Wt Readings from Last 3 Encounters:   01/17/25 111.9 kg (246 lb 9.6 oz)   12/31/24 117.2 kg (258 lb 6.4 oz)   11/26/24 112 kg (247 lb)     Physical Exam  Constitutional:       Appearance: Normal appearance.   HENT:      Head: Normocephalic and atraumatic.      Right Ear: Tympanic membrane, ear canal and external ear normal.      Left Ear: Tympanic membrane, ear canal and external ear normal.      Mouth/Throat:      Pharynx: Posterior oropharyngeal erythema present.   Lymphadenopathy:      Cervical: Cervical adenopathy present.   Neurological:      General: No focal deficit present.      Mental Status: She is alert and oriented to person, place, and time.   Psychiatric:         Mood and Affect: Mood normal.         Behavior: Behavior normal.          Assessment & Plan  1. Upper respiratory tract infection, unspecified type  -     amoxicillin-clavulanate (AUGMENTIN) 875-125 MG per tablet; Take 1 tablet by mouth 2 times daily for 7 days, Disp-14 tablet, R-0Normal  2. Arnold-Chiari malformation, type I (HCC)  Assessment & Plan:   Patient reports worsening pain. Will increased tizanidine to max of 8mg TID.

## 2025-01-27 ENCOUNTER — PATIENT MESSAGE (OUTPATIENT)
Dept: PRIMARY CARE CLINIC | Facility: CLINIC | Age: 55
End: 2025-01-27

## 2025-01-27 DIAGNOSIS — G93.5 ARNOLD-CHIARI MALFORMATION, TYPE I (HCC): ICD-10-CM

## 2025-01-27 RX ORDER — OXYCODONE AND ACETAMINOPHEN 7.5; 325 MG/1; MG/1
1 TABLET ORAL EVERY 6 HOURS PRN
Qty: 120 TABLET | Refills: 0 | Status: SHIPPED | OUTPATIENT
Start: 2025-01-27 | End: 2025-02-26

## 2025-01-29 ENCOUNTER — PATIENT MESSAGE (OUTPATIENT)
Dept: PRIMARY CARE CLINIC | Facility: CLINIC | Age: 55
End: 2025-01-29

## 2025-01-29 DIAGNOSIS — H92.09 OTALGIA, UNSPECIFIED LATERALITY: Primary | ICD-10-CM

## 2025-01-29 DIAGNOSIS — F32.A DEPRESSIVE DISORDER: ICD-10-CM

## 2025-01-30 RX ORDER — FLUOXETINE 40 MG/1
40 CAPSULE ORAL 2 TIMES DAILY
Qty: 180 CAPSULE | Refills: 3 | Status: SHIPPED | OUTPATIENT
Start: 2025-01-30

## 2025-01-30 RX ORDER — METHYLPREDNISOLONE 4 MG/1
TABLET ORAL
Qty: 48 TABLET | Refills: 0 | Status: SHIPPED | OUTPATIENT
Start: 2025-01-30 | End: 2025-02-11

## 2025-02-17 ENCOUNTER — OFFICE VISIT (OUTPATIENT)
Dept: PRIMARY CARE CLINIC | Facility: CLINIC | Age: 55
End: 2025-02-17
Payer: OTHER GOVERNMENT

## 2025-02-17 VITALS
HEIGHT: 67 IN | BODY MASS INDEX: 38.71 KG/M2 | SYSTOLIC BLOOD PRESSURE: 140 MMHG | TEMPERATURE: 98.3 F | DIASTOLIC BLOOD PRESSURE: 80 MMHG | WEIGHT: 246.6 LBS | RESPIRATION RATE: 16 BRPM | OXYGEN SATURATION: 95 %

## 2025-02-17 DIAGNOSIS — J42 CHRONIC BRONCHITIS, UNSPECIFIED CHRONIC BRONCHITIS TYPE (HCC): ICD-10-CM

## 2025-02-17 DIAGNOSIS — J06.9 UPPER RESPIRATORY TRACT INFECTION, UNSPECIFIED TYPE: Primary | ICD-10-CM

## 2025-02-17 PROCEDURE — 3077F SYST BP >= 140 MM HG: CPT | Performed by: FAMILY MEDICINE

## 2025-02-17 PROCEDURE — 99214 OFFICE O/P EST MOD 30 MIN: CPT | Performed by: FAMILY MEDICINE

## 2025-02-17 PROCEDURE — 3079F DIAST BP 80-89 MM HG: CPT | Performed by: FAMILY MEDICINE

## 2025-02-17 RX ORDER — FLUTICASONE PROPIONATE 220 UG/1
2 AEROSOL, METERED RESPIRATORY (INHALATION) 2 TIMES DAILY
Qty: 3 EACH | Refills: 3 | Status: SHIPPED | OUTPATIENT
Start: 2025-02-17

## 2025-02-17 RX ORDER — METHYLPREDNISOLONE 4 MG/1
TABLET ORAL
Qty: 48 TABLET | Refills: 0 | Status: SHIPPED | OUTPATIENT
Start: 2025-02-17

## 2025-02-17 RX ORDER — ALBUTEROL SULFATE 90 UG/1
2 INHALANT RESPIRATORY (INHALATION) EVERY 6 HOURS PRN
Qty: 18 G | Refills: 3 | Status: SHIPPED | OUTPATIENT
Start: 2025-02-17

## 2025-02-17 NOTE — PROGRESS NOTES
Subjective  Chief Complaint   Patient presents with    Cough     HPI:  Nikki Lozano is a 54 y.o. female.    History of Present Illness  The patient presents for evaluation of upper respiratory infection.    She reports that her ear pain has resolved following the second course of steroids. However, she developed a cold during the steroid treatment, characterized by rhinorrhea and a persistent cough. She also experiences gastrointestinal upset, including diarrhea, and a general lack of energy. These symptoms have been present for at least 2 weeks. She is not immunocompromised but notes a history of slow recovery from respiratory illnesses due to her asthma. She reports no reflux symptoms but continues to experience stomach upset, for which she has been self-medicating with Tums and Imodium. She has experienced weight loss, which she attributes to her current illness. She has previously tolerated amoxicillin well, without any gastrointestinal side effects.    MEDICATIONS  Current: Tums, Imodium        Past Medical History:   Diagnosis Date    Allergic rhinitis     Allergies     Asthma     COPD (chronic obstructive pulmonary disease) ([facility])     Depression     Diabetes ([facility])     GERD (gastroesophageal reflux disease)     Hypertension     Migraine     Obesity     Type 2 diabetes mellitus without complication ([facility])      Current Outpatient Medications on File Prior to Visit   Medication Sig Dispense Refill    FLUoxetine (PROZAC) 40 MG capsule TAKE 1 CAPSULE TWICE A  capsule 3    oxyCODONE-acetaminophen (PERCOCET) 7.5-325 MG per tablet Take 1 tablet by mouth every 6 hours as needed for Pain for up to 30 days. Max Daily Amount: 4 tablets 120 tablet 0    tiZANidine (ZANAFLEX) 4 MG tablet Take 2 tablets by mouth every 8 hours as needed (muscle spasm) 540 tablet 3    butalbital-acetaminophen-caffeine (FIORICET, ESGIC) -40 MG per tablet TAKE 2 TABLETS EVERY 6 HOURS AS NEEDED FOR HEADACHES 180 tablet 14    blood

## 2025-02-24 DIAGNOSIS — G93.5 ARNOLD-CHIARI MALFORMATION, TYPE I (HCC): ICD-10-CM

## 2025-02-24 RX ORDER — OXYCODONE AND ACETAMINOPHEN 7.5; 325 MG/1; MG/1
1 TABLET ORAL EVERY 6 HOURS PRN
Qty: 120 TABLET | Refills: 0 | Status: SHIPPED | OUTPATIENT
Start: 2025-02-24 | End: 2025-03-26

## 2025-03-15 ENCOUNTER — HOSPITAL ENCOUNTER (EMERGENCY)
Facility: HOSPITAL | Age: 55
Discharge: HOME | End: 2025-03-15
Attending: EMERGENCY MEDICINE
Payer: OTHER GOVERNMENT

## 2025-03-15 VITALS
HEART RATE: 100 BPM | BODY MASS INDEX: 37.67 KG/M2 | OXYGEN SATURATION: 99 % | WEIGHT: 240 LBS | TEMPERATURE: 99.7 F | RESPIRATION RATE: 18 BRPM | DIASTOLIC BLOOD PRESSURE: 64 MMHG | SYSTOLIC BLOOD PRESSURE: 144 MMHG | HEIGHT: 67 IN

## 2025-03-15 DIAGNOSIS — R19.7 DIARRHEA, UNSPECIFIED TYPE: Primary | ICD-10-CM

## 2025-03-15 DIAGNOSIS — R10.9 ABDOMINAL PAIN, UNSPECIFIED ABDOMINAL LOCATION: ICD-10-CM

## 2025-03-15 LAB
ALBUMIN SERPL-MCNC: 4.2 G/DL (ref 3.5–5.2)
ALBUMIN/GLOB SERPL: 1.2 (ref 1.1–2.2)
ALP SERPL-CCNC: 137 U/L (ref 35–104)
ALT SERPL-CCNC: 24 U/L (ref 10–35)
ANION GAP SERPL CALC-SCNC: 15 MMOL/L (ref 2–12)
AST SERPL-CCNC: 15 U/L (ref 10–35)
BASOPHILS # BLD: 0.04 K/UL (ref 0–0.1)
BASOPHILS NFR BLD: 0.5 % (ref 0–1)
BILIRUB SERPL-MCNC: 0.3 MG/DL (ref 0.2–1)
BUN SERPL-MCNC: 7 MG/DL (ref 6–20)
BUN/CREAT SERPL: 7 (ref 12–20)
CALCIUM SERPL-MCNC: 9.4 MG/DL (ref 8.6–10)
CHLORIDE SERPL-SCNC: 96 MMOL/L (ref 98–107)
CO2 SERPL-SCNC: 26 MMOL/L (ref 22–29)
CREAT SERPL-MCNC: 0.94 MG/DL (ref 0.5–0.9)
DIFFERENTIAL METHOD BLD: NORMAL
EOSINOPHIL # BLD: 0.22 K/UL (ref 0–0.4)
EOSINOPHIL NFR BLD: 2.8 % (ref 0–7)
ERYTHROCYTE [DISTWIDTH] IN BLOOD BY AUTOMATED COUNT: 13.6 % (ref 11.5–14.5)
GLOBULIN SER CALC-MCNC: 3.6 G/DL (ref 2–4)
GLUCOSE SERPL-MCNC: 175 MG/DL (ref 65–100)
HCT VFR BLD AUTO: 38.1 % (ref 35–47)
HGB BLD-MCNC: 13.3 G/DL (ref 11.5–16)
IMM GRANULOCYTES # BLD AUTO: 0.03 K/UL (ref 0–0.04)
IMM GRANULOCYTES NFR BLD AUTO: 0.4 % (ref 0–0.5)
LIPASE SERPL-CCNC: 15 U/L (ref 13–60)
LYMPHOCYTES # BLD: 1.52 K/UL (ref 0.8–3.5)
LYMPHOCYTES NFR BLD: 19.2 % (ref 12–49)
MCH RBC QN AUTO: 29.4 PG (ref 26–34)
MCHC RBC AUTO-ENTMCNC: 34.9 G/DL (ref 30–36.5)
MCV RBC AUTO: 84.1 FL (ref 80–99)
MONOCYTES # BLD: 0.71 K/UL (ref 0–1)
MONOCYTES NFR BLD: 9 % (ref 5–13)
NEUTS SEG # BLD: 5.38 K/UL (ref 1.8–8)
NEUTS SEG NFR BLD: 68.1 % (ref 32–75)
NRBC # BLD: 0 K/UL (ref 0–0.01)
NRBC BLD-RTO: 0 PER 100 WBC
PLATELET # BLD AUTO: 288 K/UL (ref 150–400)
PMV BLD AUTO: 12.8 FL (ref 8.9–12.9)
POTASSIUM SERPL-SCNC: 3.4 MMOL/L (ref 3.5–5.1)
PROT SERPL-MCNC: 7.8 G/DL (ref 6.4–8.3)
RBC # BLD AUTO: 4.53 M/UL (ref 3.8–5.2)
SODIUM SERPL-SCNC: 137 MMOL/L (ref 136–145)
WBC # BLD AUTO: 7.9 K/UL (ref 3.6–11)

## 2025-03-15 PROCEDURE — 85025 COMPLETE CBC W/AUTO DIFF WBC: CPT

## 2025-03-15 PROCEDURE — 6360000002 HC RX W HCPCS: Performed by: EMERGENCY MEDICINE

## 2025-03-15 PROCEDURE — 80053 COMPREHEN METABOLIC PANEL: CPT

## 2025-03-15 PROCEDURE — 96374 THER/PROPH/DIAG INJ IV PUSH: CPT

## 2025-03-15 PROCEDURE — 99284 EMERGENCY DEPT VISIT MOD MDM: CPT

## 2025-03-15 PROCEDURE — 36415 COLL VENOUS BLD VENIPUNCTURE: CPT

## 2025-03-15 PROCEDURE — 96375 TX/PRO/DX INJ NEW DRUG ADDON: CPT

## 2025-03-15 PROCEDURE — 6370000000 HC RX 637 (ALT 250 FOR IP): Performed by: EMERGENCY MEDICINE

## 2025-03-15 PROCEDURE — 96361 HYDRATE IV INFUSION ADD-ON: CPT

## 2025-03-15 PROCEDURE — 2580000003 HC RX 258: Performed by: EMERGENCY MEDICINE

## 2025-03-15 PROCEDURE — 83690 ASSAY OF LIPASE: CPT

## 2025-03-15 RX ORDER — DICYCLOMINE HCL 20 MG
20 TABLET ORAL
Status: COMPLETED | OUTPATIENT
Start: 2025-03-15 | End: 2025-03-15

## 2025-03-15 RX ORDER — KETOROLAC TROMETHAMINE 30 MG/ML
15 INJECTION, SOLUTION INTRAMUSCULAR; INTRAVENOUS
Status: COMPLETED | OUTPATIENT
Start: 2025-03-15 | End: 2025-03-15

## 2025-03-15 RX ORDER — METRONIDAZOLE 500 MG/1
500 TABLET ORAL 2 TIMES DAILY
Qty: 14 TABLET | Refills: 0 | Status: SHIPPED | OUTPATIENT
Start: 2025-03-15 | End: 2025-03-22

## 2025-03-15 RX ORDER — 0.9 % SODIUM CHLORIDE 0.9 %
1000 INTRAVENOUS SOLUTION INTRAVENOUS ONCE
Status: COMPLETED | OUTPATIENT
Start: 2025-03-15 | End: 2025-03-15

## 2025-03-15 RX ORDER — ONDANSETRON 4 MG/1
4 TABLET, ORALLY DISINTEGRATING ORAL 3 TIMES DAILY PRN
Qty: 21 TABLET | Refills: 0 | Status: SHIPPED | OUTPATIENT
Start: 2025-03-15 | End: 2025-03-18

## 2025-03-15 RX ORDER — ONDANSETRON 2 MG/ML
4 INJECTION INTRAMUSCULAR; INTRAVENOUS
Status: COMPLETED | OUTPATIENT
Start: 2025-03-15 | End: 2025-03-15

## 2025-03-15 RX ORDER — DICYCLOMINE HYDROCHLORIDE 10 MG/1
10 CAPSULE ORAL
Qty: 40 CAPSULE | Refills: 0 | Status: SHIPPED | OUTPATIENT
Start: 2025-03-15 | End: 2025-03-25

## 2025-03-15 RX ADMIN — KETOROLAC TROMETHAMINE 15 MG: 30 INJECTION, SOLUTION INTRAMUSCULAR at 19:55

## 2025-03-15 RX ADMIN — ONDANSETRON 4 MG: 2 INJECTION, SOLUTION INTRAMUSCULAR; INTRAVENOUS at 19:54

## 2025-03-15 RX ADMIN — SODIUM CHLORIDE 1000 ML: 9 INJECTION, SOLUTION INTRAVENOUS at 19:49

## 2025-03-15 RX ADMIN — DICYCLOMINE HYDROCHLORIDE 20 MG: 20 TABLET ORAL at 21:36

## 2025-03-15 ASSESSMENT — ENCOUNTER SYMPTOMS
SORE THROAT: 0
NAUSEA: 1
DIARRHEA: 1
COUGH: 0

## 2025-03-15 ASSESSMENT — PAIN SCALES - GENERAL
PAINLEVEL_OUTOF10: 5
PAINLEVEL_OUTOF10: 5
PAINLEVEL_OUTOF10: 4
PAINLEVEL_OUTOF10: 3

## 2025-03-15 ASSESSMENT — PAIN - FUNCTIONAL ASSESSMENT
PAIN_FUNCTIONAL_ASSESSMENT: 0-10
PAIN_FUNCTIONAL_ASSESSMENT: 0-10

## 2025-03-15 ASSESSMENT — PAIN DESCRIPTION - LOCATION: LOCATION: ABDOMEN

## 2025-03-15 ASSESSMENT — LIFESTYLE VARIABLES
HOW OFTEN DO YOU HAVE A DRINK CONTAINING ALCOHOL: NEVER
HOW MANY STANDARD DRINKS CONTAINING ALCOHOL DO YOU HAVE ON A TYPICAL DAY: PATIENT DOES NOT DRINK

## 2025-03-15 ASSESSMENT — PAIN DESCRIPTION - ORIENTATION: ORIENTATION: RIGHT;LEFT;LOWER

## 2025-03-15 NOTE — ED PROVIDER NOTES
[facility] EMERGENCY DEPARTMENT  EMERGENCY DEPARTMENT ENCOUNTER      Pt Name: Nikki Lozano  MRN: [id number]  Birthdate 1970  Date of evaluation: 3/15/2025  Provider: Gentry Giron MD    CHIEF COMPLAINT       Chief Complaint   Patient presents with    Nausea    Abdominal Pain         HISTORY OF PRESENT ILLNESS   (Location/Symptom, Timing/Onset, Context/Setting, Quality, Duration, Modifying Factors, Severity)  Note limiting factors.   54-year-old female reports from home with complaints of abdominal pain and diarrhea.  States she started not feeling well last weekend.  Then on Monday started having diarrhea and nausea.  She has has had little to eat or drink since then because of nausea.  Denies any vomiting.  Has had multiple episodes of loose stool every day.  Denies any gross blood.  No known fevers at home.  States she had symptoms like this in the past attributed to Giardia.  States [place]  Review of EMR shows a history significant for asthma, COPD, depression, diabetes, GERD, hypertension, migraine, obesity.    The history is provided by the patient and medical records.         Review of External Medical Records:     Nursing Notes were reviewed.    REVIEW OF SYSTEMS    (2-9 systems for level 4, 10 or more for level 5)     Review of Systems   Constitutional:  Negative for fatigue.   HENT:  Negative for sore throat.    Eyes:  Negative for visual disturbance.   Respiratory:  Negative for cough.    Cardiovascular:  Negative for palpitations.   Gastrointestinal:  Positive for diarrhea and nausea.   Genitourinary:  Negative for difficulty urinating.   Musculoskeletal:  Negative for myalgias.   Skin:  Negative for rash.   Neurological:  Negative for weakness.       Except as noted above the remainder of the review of systems was reviewed and negative.       PAST MEDICAL HISTORY     Past Medical History:   Diagnosis Date    Allergic rhinitis      Allergies     Asthma     COPD (chronic obstructive pulmonary disease) (HCC)     Depression     Diabetes (HCC)     GERD (gastroesophageal reflux disease)     Hypertension     Migraine     Obesity     Type 2 diabetes mellitus without complication (HCC)          SURGICAL HISTORY       Past Surgical History:   Procedure Laterality Date    ANKLE SURGERY  1993, ,     BREAST REDUCTION SURGERY       SECTION      CHOLECYSTECTOMY      DILATION AND CURETTAGE OF UTERUS      HERNIA REPAIR      2008    UMBILICAL HERNIA REPAIR      UTERUS SURGERY  2014         CURRENT MEDICATIONS       Previous Medications    ALBUTEROL SULFATE HFA (PROVENTIL;VENTOLIN;PROAIR) 108 (90 BASE) MCG/ACT INHALER    Inhale 2 puffs into the lungs every 6 hours as needed for Wheezing    AMLODIPINE (NORVASC) 10 MG TABLET    Take 1 tablet by mouth at bedtime    ASCORBIC ACID (VITAMIN C) 1000 MG TABLET    Take 1 tablet by mouth daily    ATORVASTATIN (LIPITOR) 20 MG TABLET    Take 1 tablet by mouth nightly    BLOOD GLUCOSE TEST STRIPS (FREESTYLE LITE) STRIP    Inject 1 each into the skin 2 times daily    BUTALBITAL-ACETAMINOPHEN-CAFFEINE (FIORICET, ESGIC) -40 MG PER TABLET    TAKE 2 TABLETS EVERY 6 HOURS AS NEEDED FOR HEADACHES    CREON [id number] UNITS CPEP DELAYED RELEASE CAPSULE    Take by mouth    DICLOFENAC (VOLTAREN) 75 MG EC TABLET    Take 1 tablet by mouth 2 times daily    FAMOTIDINE (PEPCID) 20 MG TABLET    Take 1 tablet by mouth in the morning and at bedtime    FLUOXETINE (PROZAC) 40 MG CAPSULE    TAKE 1 CAPSULE TWICE A DAY    FLUTICASONE (FLONASE) 50 MCG/ACT NASAL SPRAY    2 sprays by Nasal route daily    FLUTICASONE (FLOVENT HFA) 220 MCG/ACT INHALER    Inhale 2 puffs into the lungs 2 times daily    GLUCOSE MONITORING KIT    1 kit by Does not apply route daily Use twice daily glucose testing. E11.65    GLYBURIDE-METFORMIN (GLUCOVANCE) 5-500 MG PER TABLET    TAKE 1 TABLET TWICE A DAY WITH MEALS

## 2025-03-15 NOTE — ED TRIAGE NOTES
Patient ambulatory to triage by self. C/O lower abdominal pain. Endorses N/V/D since Wednesday. Recently got back from [place] Monday night. Been taking imodium without relief. PMH of gallbladder removal and these same episode in the past status-post giardia in 2019.

## 2025-03-16 NOTE — ED NOTES
Patient reports symptom improvement or at least no worsening of symptoms since arrival to ED.  Pain reassessment 3/10.  VSS at time of discharge.  I have reviewed discharge instructions with the patient, who verbalized understanding. Patient stable and discharged from ED via AMBULATORY  and with SELF.   If applicable, PIV removed yes

## 2025-03-18 ENCOUNTER — OFFICE VISIT (OUTPATIENT)
Dept: PRIMARY CARE CLINIC | Facility: CLINIC | Age: 55
End: 2025-03-18
Payer: OTHER GOVERNMENT

## 2025-03-18 ENCOUNTER — PATIENT MESSAGE (OUTPATIENT)
Dept: PRIMARY CARE CLINIC | Facility: CLINIC | Age: 55
End: 2025-03-18

## 2025-03-18 VITALS
DIASTOLIC BLOOD PRESSURE: 83 MMHG | OXYGEN SATURATION: 97 % | HEIGHT: 67 IN | SYSTOLIC BLOOD PRESSURE: 140 MMHG | BODY MASS INDEX: 37.83 KG/M2 | WEIGHT: 241 LBS | RESPIRATION RATE: 16 BRPM | HEART RATE: 81 BPM

## 2025-03-18 DIAGNOSIS — R11.11 VOMITING WITHOUT NAUSEA, UNSPECIFIED VOMITING TYPE: Primary | ICD-10-CM

## 2025-03-18 LAB
EXP DATE SOLUTION: ABNORMAL
EXP DATE SWAB: ABNORMAL
EXPIRATION DATE: ABNORMAL
INFLUENZA A ANTIGEN, POC: NEGATIVE
INFLUENZA B ANTIGEN, POC: NEGATIVE
LOT NUMBER POC: ABNORMAL
LOT NUMBER SOLUTION: ABNORMAL
LOT NUMBER SWAB: ABNORMAL
SARS-COV-2 RNA, POC: NEGATIVE
VALID INTERNAL CONTROL, POC: NORMAL

## 2025-03-18 PROCEDURE — 87502 INFLUENZA DNA AMP PROBE: CPT | Performed by: STUDENT IN AN ORGANIZED HEALTH CARE EDUCATION/TRAINING PROGRAM

## 2025-03-18 PROCEDURE — 3079F DIAST BP 80-89 MM HG: CPT | Performed by: STUDENT IN AN ORGANIZED HEALTH CARE EDUCATION/TRAINING PROGRAM

## 2025-03-18 PROCEDURE — 99213 OFFICE O/P EST LOW 20 MIN: CPT | Performed by: STUDENT IN AN ORGANIZED HEALTH CARE EDUCATION/TRAINING PROGRAM

## 2025-03-18 PROCEDURE — 87635 SARS-COV-2 COVID-19 AMP PRB: CPT | Performed by: STUDENT IN AN ORGANIZED HEALTH CARE EDUCATION/TRAINING PROGRAM

## 2025-03-18 PROCEDURE — 3077F SYST BP >= 140 MM HG: CPT | Performed by: STUDENT IN AN ORGANIZED HEALTH CARE EDUCATION/TRAINING PROGRAM

## 2025-03-19 RX ORDER — ONDANSETRON 8 MG/1
8 TABLET, ORALLY DISINTEGRATING ORAL EVERY 8 HOURS PRN
Qty: 24 TABLET | Refills: 0 | Status: SHIPPED | OUTPATIENT
Start: 2025-03-19

## 2025-03-19 ASSESSMENT — ENCOUNTER SYMPTOMS: NAUSEA: 1

## 2025-03-19 NOTE — PROGRESS NOTES
\"Have you been to the ER, urgent care clinic since your last visit?  Hospitalized since your last visit?\"    YES - When: approximately 1 days ago.  Where and Why: Saturday for gi upset .    “Have you seen or consulted any other health care providers outside our system since your last visit?”    NO     “Have you had a pap smear?”    NO    No cervical cancer screening on file       “Have you had a colorectal cancer screening such as a colonoscopy/FIT/Cologuard?    NO    No colonoscopy on file  No cologuard on file  No FIT/FOBT on file   No flexible sigmoidoscopy on file     “Have you had a diabetic eye exam?”    NO     No diabetic eye exam on file   Chief Complaint   Patient presents with    Nausea & Vomiting     Went to er Saturday gave bag of fluids told patient they didn't have answers for her she was throwing up all am .      BP (!) 140/83 (BP Site: Left Upper Arm, Patient Position: Sitting, BP Cuff Size: Large Adult)   Pulse 81   Resp 16   Ht 1.702 m (5' 7\")   Wt 109.3 kg (241 lb)   SpO2 97%   BMI 37.75 kg/m²            
nursing note reviewed.   Constitutional:       Appearance: Normal appearance. She is normal weight.   HENT:      Head: Normocephalic and atraumatic.      Mouth/Throat:      Mouth: Mucous membranes are moist.      Pharynx: Oropharynx is clear.   Cardiovascular:      Rate and Rhythm: Normal rate and regular rhythm.      Heart sounds: Normal heart sounds.   Pulmonary:      Effort: Pulmonary effort is normal. No respiratory distress.      Breath sounds: Normal breath sounds.   Neurological:      Mental Status: She is alert.   Psychiatric:         Mood and Affect: Mood normal.         Behavior: Behavior normal.         Thought Content: Thought content normal.         Judgment: Judgment normal.       Physical Exam  Lungs were auscultated.         Results        The patient (or guardian, if applicable) and other individuals in attendance with the patient were advised that Artificial Intelligence will be utilized during this visit to record, process the conversation to generate a clinical note, and support improvement of the AI technology. The patient (or guardian, if applicable) and other individuals in attendance at the appointment consented to the use of AI, including the recording.              An electronic signature was used to authenticate this note.    --Abner Hayes MD

## 2025-03-24 DIAGNOSIS — G93.5 ARNOLD-CHIARI MALFORMATION, TYPE I (HCC): ICD-10-CM

## 2025-03-24 RX ORDER — OXYCODONE AND ACETAMINOPHEN 7.5; 325 MG/1; MG/1
1 TABLET ORAL EVERY 6 HOURS PRN
Qty: 120 TABLET | Refills: 0 | Status: SHIPPED | OUTPATIENT
Start: 2025-03-24 | End: 2025-04-23

## 2025-04-10 ENCOUNTER — OFFICE VISIT (OUTPATIENT)
Dept: PRIMARY CARE CLINIC | Facility: CLINIC | Age: 55
End: 2025-04-10
Payer: OTHER GOVERNMENT

## 2025-04-10 VITALS
HEART RATE: 86 BPM | SYSTOLIC BLOOD PRESSURE: 128 MMHG | BODY MASS INDEX: 37.04 KG/M2 | HEIGHT: 67 IN | WEIGHT: 236 LBS | TEMPERATURE: 98.8 F | RESPIRATION RATE: 16 BRPM | DIASTOLIC BLOOD PRESSURE: 93 MMHG | OXYGEN SATURATION: 99 %

## 2025-04-10 DIAGNOSIS — K52.9 GASTROENTERITIS: Primary | ICD-10-CM

## 2025-04-10 PROCEDURE — 3080F DIAST BP >= 90 MM HG: CPT | Performed by: FAMILY MEDICINE

## 2025-04-10 PROCEDURE — 3074F SYST BP LT 130 MM HG: CPT | Performed by: FAMILY MEDICINE

## 2025-04-10 PROCEDURE — 99213 OFFICE O/P EST LOW 20 MIN: CPT | Performed by: FAMILY MEDICINE

## 2025-04-10 RX ORDER — PROMETHAZINE HYDROCHLORIDE 25 MG/1
25 TABLET ORAL 4 TIMES DAILY PRN
Qty: 60 TABLET | Refills: 0 | Status: SHIPPED | OUTPATIENT
Start: 2025-04-10 | End: 2025-04-25

## 2025-04-10 NOTE — PROGRESS NOTES
\"Have you been to the ER, urgent care clinic since your last visit?  Hospitalized since your last visit?\"    NO    “Have you seen or consulted any other health care providers outside our system since your last visit?”    NO     “Have you had a pap smear?”    NO    No cervical cancer screening on file       “Have you had a colorectal cancer screening such as a colonoscopy/FIT/Cologuard?    NO    No colonoscopy on file  No cologuard on file  No FIT/FOBT on file   No flexible sigmoidoscopy on file     “Have you had a diabetic eye exam?”    NO     No diabetic eye exam on file          
Appearance: Normal appearance.   HENT:      Head: Normocephalic and atraumatic.   Neurological:      General: No focal deficit present.      Mental Status: She is alert and oriented to person, place, and time.   Psychiatric:         Mood and Affect: Mood normal.         Behavior: Behavior normal.          Assessment & Plan  1. Gastroenteritis  Comments:  Concern for diverticulitis given duration of disease, will treat with amoxicillin.  Orders:  -     amoxicillin-clavulanate (AUGMENTIN) 875-125 MG per tablet; Take 1 tablet by mouth 2 times daily for 7 days, Disp-14 tablet, R-0Normal  -     promethazine (PHENERGAN) 25 MG tablet; Take 1 tablet by mouth 4 times daily as needed for Nausea, Disp-60 tablet, R-0Normal        Aspects of this note have been generated using voice recognition software. Despite editing, there may be some syntax errors    Reji Huerta MD

## 2025-04-17 ENCOUNTER — PATIENT MESSAGE (OUTPATIENT)
Dept: PRIMARY CARE CLINIC | Facility: CLINIC | Age: 55
End: 2025-04-17

## 2025-04-17 DIAGNOSIS — K52.9 GASTROENTERITIS: ICD-10-CM

## 2025-04-22 DIAGNOSIS — I10 ESSENTIAL HYPERTENSION: ICD-10-CM

## 2025-04-22 DIAGNOSIS — G93.5 ARNOLD-CHIARI MALFORMATION, TYPE I (HCC): ICD-10-CM

## 2025-04-22 RX ORDER — OXYCODONE AND ACETAMINOPHEN 7.5; 325 MG/1; MG/1
1 TABLET ORAL EVERY 6 HOURS PRN
Qty: 120 TABLET | Refills: 0 | Status: SHIPPED | OUTPATIENT
Start: 2025-04-22 | End: 2025-05-22

## 2025-04-24 RX ORDER — TELMISARTAN 80 MG/1
80 TABLET ORAL NIGHTLY
Qty: 90 TABLET | Refills: 3 | Status: SHIPPED | OUTPATIENT
Start: 2025-04-24

## 2025-04-25 ENCOUNTER — HOSPITAL ENCOUNTER (EMERGENCY)
Facility: HOSPITAL | Age: 55
Discharge: HOME | End: 2025-04-25
Attending: EMERGENCY MEDICINE
Payer: OTHER GOVERNMENT

## 2025-04-25 ENCOUNTER — APPOINTMENT (OUTPATIENT)
Facility: HOSPITAL | Age: 55
End: 2025-04-25
Payer: OTHER GOVERNMENT

## 2025-04-25 ENCOUNTER — TELEPHONE (OUTPATIENT)
Dept: PRIMARY CARE CLINIC | Facility: CLINIC | Age: 55
End: 2025-04-25

## 2025-04-25 VITALS
OXYGEN SATURATION: 95 % | RESPIRATION RATE: 19 BRPM | HEIGHT: 67 IN | HEART RATE: 80 BPM | BODY MASS INDEX: 40.37 KG/M2 | SYSTOLIC BLOOD PRESSURE: 148 MMHG | DIASTOLIC BLOOD PRESSURE: 80 MMHG | WEIGHT: 257.2 LBS | TEMPERATURE: 98.3 F

## 2025-04-25 DIAGNOSIS — R79.89 ELEVATED LFTS: ICD-10-CM

## 2025-04-25 DIAGNOSIS — R19.7 VOMITING AND DIARRHEA: Primary | ICD-10-CM

## 2025-04-25 DIAGNOSIS — E87.6 HYPOKALEMIA: ICD-10-CM

## 2025-04-25 DIAGNOSIS — R11.10 VOMITING AND DIARRHEA: Primary | ICD-10-CM

## 2025-04-25 DIAGNOSIS — J98.4 PNEUMONITIS: ICD-10-CM

## 2025-04-25 LAB
ALBUMIN SERPL-MCNC: 3.5 G/DL (ref 3.5–5.2)
ALBUMIN SERPL-MCNC: 3.9 G/DL (ref 3.5–5.2)
ALBUMIN/GLOB SERPL: 1.3 (ref 1.1–2.2)
ALBUMIN/GLOB SERPL: 1.3 (ref 1.1–2.2)
ALP SERPL-CCNC: 128 U/L (ref 35–104)
ALP SERPL-CCNC: 132 U/L (ref 35–104)
ALT SERPL-CCNC: 273 U/L (ref 10–35)
ALT SERPL-CCNC: 284 U/L (ref 10–35)
ANION GAP SERPL CALC-SCNC: 12 MMOL/L (ref 2–12)
ANION GAP SERPL CALC-SCNC: 13 MMOL/L (ref 2–12)
AST SERPL-CCNC: 354 U/L (ref 10–35)
AST SERPL-CCNC: 449 U/L (ref 10–35)
BASOPHILS # BLD: 0 K/UL (ref 0–0.1)
BASOPHILS NFR BLD: 0 % (ref 0–1)
BILIRUB SERPL-MCNC: 0.5 MG/DL (ref 0.2–1)
BILIRUB SERPL-MCNC: 0.8 MG/DL (ref 0.2–1)
BUN SERPL-MCNC: 13 MG/DL (ref 6–20)
BUN SERPL-MCNC: 15 MG/DL (ref 6–20)
BUN/CREAT SERPL: 12 (ref 12–20)
BUN/CREAT SERPL: 13 (ref 12–20)
CALCIUM SERPL-MCNC: 10.4 MG/DL (ref 8.6–10)
CALCIUM SERPL-MCNC: 10.9 MG/DL (ref 8.6–10)
CHLORIDE SERPL-SCNC: 94 MMOL/L (ref 98–107)
CHLORIDE SERPL-SCNC: 96 MMOL/L (ref 98–107)
CO2 SERPL-SCNC: 26 MMOL/L (ref 22–29)
CO2 SERPL-SCNC: 27 MMOL/L (ref 22–29)
COMMENT:: NORMAL
CREAT SERPL-MCNC: 1.02 MG/DL (ref 0.5–0.9)
CREAT SERPL-MCNC: 1.26 MG/DL (ref 0.5–0.9)
DIFFERENTIAL METHOD BLD: ABNORMAL
EOSINOPHIL # BLD: 0.07 K/UL (ref 0–0.4)
EOSINOPHIL NFR BLD: 1 % (ref 0–7)
ERYTHROCYTE [DISTWIDTH] IN BLOOD BY AUTOMATED COUNT: 13.2 % (ref 11.5–14.5)
GLOBULIN SER CALC-MCNC: 2.8 G/DL (ref 2–4)
GLOBULIN SER CALC-MCNC: 3 G/DL (ref 2–4)
GLUCOSE SERPL-MCNC: 152 MG/DL (ref 65–100)
GLUCOSE SERPL-MCNC: 190 MG/DL (ref 65–100)
HCT VFR BLD AUTO: 36.5 % (ref 35–47)
HGB BLD-MCNC: 12.3 G/DL (ref 11.5–16)
IMM GRANULOCYTES # BLD AUTO: 0 K/UL
IMM GRANULOCYTES NFR BLD AUTO: 0 %
LIPASE SERPL-CCNC: 27 U/L (ref 13–60)
LYMPHOCYTES # BLD: 0.78 K/UL (ref 0.8–3.5)
LYMPHOCYTES NFR BLD: 11 % (ref 12–49)
MCH RBC QN AUTO: 29.8 PG (ref 26–34)
MCHC RBC AUTO-ENTMCNC: 33.7 G/DL (ref 30–36.5)
MCV RBC AUTO: 88.4 FL (ref 80–99)
MONOCYTES # BLD: 0.57 K/UL (ref 0–1)
MONOCYTES NFR BLD: 8 % (ref 5–13)
NEUTS BAND NFR BLD MANUAL: 7 % (ref 0–6)
NEUTS SEG # BLD: 5.68 K/UL (ref 1.8–8)
NEUTS SEG NFR BLD: 73 % (ref 32–75)
NRBC # BLD: 0 K/UL (ref 0–0.01)
NRBC BLD-RTO: 0 PER 100 WBC
PLATELET # BLD AUTO: 155 K/UL (ref 150–400)
POTASSIUM SERPL-SCNC: 3.3 MMOL/L (ref 3.5–5.1)
POTASSIUM SERPL-SCNC: 4.2 MMOL/L (ref 3.5–5.1)
PROT SERPL-MCNC: 6.3 G/DL (ref 6.4–8.3)
PROT SERPL-MCNC: 6.9 G/DL (ref 6.4–8.3)
RBC # BLD AUTO: 4.13 M/UL (ref 3.8–5.2)
RBC MORPH BLD: ABNORMAL
SODIUM SERPL-SCNC: 134 MMOL/L (ref 136–145)
SODIUM SERPL-SCNC: 134 MMOL/L (ref 136–145)
SPECIMEN HOLD: NORMAL
WBC # BLD AUTO: 7.1 K/UL (ref 3.6–11)
WBC MORPH BLD: ABNORMAL

## 2025-04-25 PROCEDURE — 6360000002 HC RX W HCPCS

## 2025-04-25 PROCEDURE — 85025 COMPLETE CBC W/AUTO DIFF WBC: CPT

## 2025-04-25 PROCEDURE — 71045 X-RAY EXAM CHEST 1 VIEW: CPT

## 2025-04-25 PROCEDURE — 74178 CT ABD&PLV WO CNTR FLWD CNTR: CPT

## 2025-04-25 PROCEDURE — 2580000003 HC RX 258: Performed by: EMERGENCY MEDICINE

## 2025-04-25 PROCEDURE — 99285 EMERGENCY DEPT VISIT HI MDM: CPT

## 2025-04-25 PROCEDURE — 6360000004 HC RX CONTRAST MEDICATION

## 2025-04-25 PROCEDURE — 2580000003 HC RX 258

## 2025-04-25 PROCEDURE — 83690 ASSAY OF LIPASE: CPT

## 2025-04-25 PROCEDURE — 80053 COMPREHEN METABOLIC PANEL: CPT

## 2025-04-25 PROCEDURE — 6370000000 HC RX 637 (ALT 250 FOR IP)

## 2025-04-25 PROCEDURE — 96374 THER/PROPH/DIAG INJ IV PUSH: CPT

## 2025-04-25 PROCEDURE — 36415 COLL VENOUS BLD VENIPUNCTURE: CPT

## 2025-04-25 PROCEDURE — 96361 HYDRATE IV INFUSION ADD-ON: CPT

## 2025-04-25 PROCEDURE — 93005 ELECTROCARDIOGRAM TRACING: CPT | Performed by: EMERGENCY MEDICINE

## 2025-04-25 RX ORDER — SODIUM CHLORIDE, SODIUM LACTATE, POTASSIUM CHLORIDE, AND CALCIUM CHLORIDE .6; .31; .03; .02 G/100ML; G/100ML; G/100ML; G/100ML
1000 INJECTION, SOLUTION INTRAVENOUS ONCE
Status: COMPLETED | OUTPATIENT
Start: 2025-04-25 | End: 2025-04-25

## 2025-04-25 RX ORDER — IOPAMIDOL 755 MG/ML
100 INJECTION, SOLUTION INTRAVASCULAR
Status: CANCELLED | OUTPATIENT
Start: 2025-04-25

## 2025-04-25 RX ORDER — POTASSIUM CHLORIDE 750 MG/1
20 TABLET, EXTENDED RELEASE ORAL ONCE
Status: COMPLETED | OUTPATIENT
Start: 2025-04-25 | End: 2025-04-25

## 2025-04-25 RX ORDER — IOPAMIDOL 755 MG/ML
100 INJECTION, SOLUTION INTRAVASCULAR
Status: COMPLETED | OUTPATIENT
Start: 2025-04-25 | End: 2025-04-25

## 2025-04-25 RX ORDER — ONDANSETRON 2 MG/ML
4 INJECTION INTRAMUSCULAR; INTRAVENOUS
Status: COMPLETED | OUTPATIENT
Start: 2025-04-25 | End: 2025-04-25

## 2025-04-25 RX ADMIN — SODIUM CHLORIDE, POTASSIUM CHLORIDE, SODIUM LACTATE AND CALCIUM CHLORIDE 1000 ML: 600; 310; 30; 20 INJECTION, SOLUTION INTRAVENOUS at 12:07

## 2025-04-25 RX ADMIN — SODIUM CHLORIDE, POTASSIUM CHLORIDE, SODIUM LACTATE AND CALCIUM CHLORIDE 1000 ML: 600; 310; 30; 20 INJECTION, SOLUTION INTRAVENOUS at 09:58

## 2025-04-25 RX ADMIN — ONDANSETRON 4 MG: 2 INJECTION, SOLUTION INTRAMUSCULAR; INTRAVENOUS at 09:59

## 2025-04-25 RX ADMIN — IOPAMIDOL 100 ML: 755 INJECTION, SOLUTION INTRAVENOUS at 11:53

## 2025-04-25 RX ADMIN — POTASSIUM CHLORIDE 20 MEQ: 750 TABLET, FILM COATED, EXTENDED RELEASE ORAL at 10:54

## 2025-04-25 ASSESSMENT — ENCOUNTER SYMPTOMS
CHEST TIGHTNESS: 0
WHEEZING: 0
ABDOMINAL PAIN: 0
COUGH: 1
DIARRHEA: 0
SHORTNESS OF BREATH: 0
VOMITING: 1
NAUSEA: 1

## 2025-04-25 ASSESSMENT — PAIN SCALES - GENERAL: PAINLEVEL_OUTOF10: 4

## 2025-04-25 ASSESSMENT — PAIN DESCRIPTION - LOCATION: LOCATION: CHEST

## 2025-04-25 ASSESSMENT — PAIN DESCRIPTION - DESCRIPTORS: DESCRIPTORS: BURNING

## 2025-04-25 ASSESSMENT — PAIN - FUNCTIONAL ASSESSMENT
PAIN_FUNCTIONAL_ASSESSMENT: 0-10
PAIN_FUNCTIONAL_ASSESSMENT: PREVENTS OR INTERFERES SOME ACTIVE ACTIVITIES AND ADLS

## 2025-04-25 ASSESSMENT — PAIN DESCRIPTION - PAIN TYPE: TYPE: ACUTE PAIN

## 2025-04-25 NOTE — TELEPHONE ENCOUNTER
Pt called stating that she has been vomiting most of the night and thinks that she may have aspirated into her lungs, she states that she is having burning and SOB. Pt has been informed she needs to go to urgent care to be evaluated. Pt agrees

## 2025-04-25 NOTE — ED PROVIDER NOTES
[facility] EMERGENCY DEPARTMENT  EMERGENCY DEPARTMENT ENCOUNTER      Pt Name: Nikki Lozano  MRN: [id number]  Birthdate 1970  Date of evaluation: 2025  Provider: Fina Low MD    CHIEF COMPLAINT       Chief Complaint   Patient presents with    Vomiting    Diarrhea         HISTORY OF PRESENT ILLNESS   (Location/Symptom, Timing/Onset, Context/Setting, Quality, Duration, Modifying Factors, Severity)  Note limiting factors.   54-year-old female with a history of GERD, hyperlipidemia, diabetes, asthma, hypertension, EPI and dairy allergy presenting with new onset vomiting.  Patient with vomiting and diarrhea for the past 2 months.  Diarrhea has now resolved.  Vomiting started yesterday after accidentally eating cheese.  8-10 episodes since yesterday night.  Patient also endorses choking on her vomit which has led to a cough and burning sensation in her chest.      Of note patient's antibiotic use in :   Augmentin for reactive airway disease;  Augmentin for RAD; 3/15 Flagyl orally treated for GERD;  Augmentin for possible diverticulitis    Denies any fever or chills, chest pain, shortness of breath, abdominal pain, dysuria/urgency/frequency. Hx of umbilical hernia repair and cholecystectomy, .        Review of External Medical Records:     Nursing Notes were reviewed.    REVIEW OF SYSTEMS    (2-9 systems for level 4, 10 or more for level 5)     Review of Systems   Constitutional:  Negative for chills, fever and unexpected weight change.   HENT:  Negative for congestion.    Eyes:  Negative for visual disturbance.   Respiratory:  Positive for cough. Negative for chest tightness, shortness of breath and wheezing.    Cardiovascular:  Negative for chest pain, palpitations and leg swelling.   Gastrointestinal:  Positive for nausea and vomiting. Negative for abdominal pain and diarrhea.   Endocrine: Negative for polydipsia, polyphagia and polyuria.   Genitourinary:  Negative for  Depression Other     High Cholesterol Mother     Depression Sister     High Blood Pressure Sister     Depression Sister     High Blood Pressure Sister     Obesity Sister     Miscarriages / Stillbirths Sister           SOCIAL HISTORY       Social History     Socioeconomic History    Marital status:    Tobacco Use    Smoking status: Never    Smokeless tobacco: Never   Substance and Sexual Activity    Alcohol use: Yes     Comment: Less    Drug use: Never    Sexual activity: Yes     Partners: Male     Social Drivers of Health     Financial Resource Strain: Low Risk  (1/3/2024)    Overall Financial Resource Strain (CARDIA)     Difficulty of Paying Living Expenses: Not hard at all   Food Insecurity: No Food Insecurity (1/17/2025)    Hunger Vital Sign     Worried About Running Out of Food in the Last Year: Never true     Ran Out of Food in the Last Year: Never true   Transportation Needs: No Transportation Needs (1/17/2025)    PRAPARE - Transportation     Lack of Transportation (Medical): No     Lack of Transportation (Non-Medical): No   Physical Activity: Insufficiently Active (7/25/2024)    Exercise Vital Sign     Days of Exercise per Week: 2 days     Minutes of Exercise per Session: 60 min   Housing Stability: Low Risk  (1/17/2025)    Housing Stability Vital Sign     Unable to Pay for Housing in the Last Year: No     Number of Times Moved in the Last Year: 0     Homeless in the Last Year: No           PHYSICAL EXAM    (up to 7 for level 4, 8 or more for level 5)     ED Triage Vitals [04/25/25 0937]   BP Systolic BP Percentile Diastolic BP Percentile Temp Temp src Pulse Respirations SpO2   (!) 148/80 -- -- 98.2 °F (36.8 °C) -- 80 18 95 %      Height Weight - Scale         1.702 m (5' 7\") 116.7 kg (257 lb 3.2 oz)             Body mass index is 40.28 kg/m².    Physical Exam  Constitutional:       Appearance: She is normal weight.   HENT:      Head: Normocephalic and atraumatic.      Mouth/Throat:      Mouth: Mucous

## 2025-04-25 NOTE — ED TRIAGE NOTES
Patient arrives c/o of intermittent diarrhea and abdominal pain x 2 months, reports that her PCP has prescribed her amoxicillin and cephalexin but is unsure of diagnosis. Reports new onset of vomiting today. Denies fever, back pain, urinary symptoms.    Reports that she has vomiting and diarrhea from eating dairy and accidentally ate a small amount of cheese yesterday.    Hx of umbilical hernia repair and cholecystectomy, .

## 2025-04-25 NOTE — ED NOTES
Discharge instructions reviewed with patient. Opportunity for questions given. Pt stable and ambulatory at discharge.

## 2025-04-25 NOTE — DISCHARGE INSTRUCTIONS
Routine appointments for health maintenance with a primary care provider are very important and emergency department visits are no substitute.  You should review all findings and test results from your visit today with your primary care physician.        Please use your albuterol inhaler every 4 hours as needed for shortness of breath or coughing.    Please follow-up with your PCP to have repeat lab work performed for your liver enzymes.    Increase hydration.    Return to the emergency department for any new or concerning signs/symptoms or failure to improve such as persistent symptoms greater than 48 hours, development of fevers, productive cough.

## 2025-04-26 LAB
EKG ATRIAL RATE: 77 BPM
EKG DIAGNOSIS: NORMAL
EKG P AXIS: 68 DEGREES
EKG P-R INTERVAL: 180 MS
EKG Q-T INTERVAL: 398 MS
EKG QRS DURATION: 102 MS
EKG QTC CALCULATION (BAZETT): 450 MS
EKG R AXIS: 85 DEGREES
EKG T AXIS: 73 DEGREES
EKG VENTRICULAR RATE: 77 BPM

## 2025-04-26 PROCEDURE — 93010 ELECTROCARDIOGRAM REPORT: CPT | Performed by: SPECIALIST

## 2025-05-02 ENCOUNTER — TELEPHONE (OUTPATIENT)
Dept: PRIMARY CARE CLINIC | Facility: CLINIC | Age: 55
End: 2025-05-02

## 2025-05-02 DIAGNOSIS — R11.11 VOMITING WITHOUT NAUSEA, UNSPECIFIED VOMITING TYPE: ICD-10-CM

## 2025-05-02 RX ORDER — LEVOFLOXACIN 750 MG/1
750 TABLET, FILM COATED ORAL DAILY
Qty: 14 TABLET | Refills: 0 | Status: SHIPPED | OUTPATIENT
Start: 2025-05-02 | End: 2025-05-16

## 2025-05-02 RX ORDER — PROMETHAZINE HYDROCHLORIDE 25 MG/1
25 TABLET ORAL 4 TIMES DAILY PRN
Qty: 60 TABLET | Refills: 1 | Status: SHIPPED | OUTPATIENT
Start: 2025-05-02 | End: 2025-06-01

## 2025-05-02 RX ORDER — ONDANSETRON 8 MG/1
8 TABLET, ORALLY DISINTEGRATING ORAL EVERY 8 HOURS PRN
Qty: 60 TABLET | Refills: 1 | Status: SHIPPED | OUTPATIENT
Start: 2025-05-02

## 2025-05-02 NOTE — TELEPHONE ENCOUNTER
Patient reports after stopping 2 weeks of amoxicillin, her GI symptoms returned.  Suspect diverticulitis.  Will try switching to Levaquin for 2 weeks to see if that provides relief.  Patient has a follow-up with GI in a month.

## 2025-05-02 NOTE — TELEPHONE ENCOUNTER
Patient called with an update on her condition, patient has finished the antibiotic on Thursday and she is not having vomiting and diaherra symptoms. Lorne wants a call back [phone]

## 2025-05-02 NOTE — TELEPHONE ENCOUNTER
Spoke with pt in ref to symptoms she states that she is still having vomiting and diaherra symptoms, she states that she is now having times of shivering and times of burning hot.

## 2025-05-16 ENCOUNTER — OFFICE VISIT (OUTPATIENT)
Dept: PRIMARY CARE CLINIC | Facility: CLINIC | Age: 55
End: 2025-05-16

## 2025-05-16 VITALS
OXYGEN SATURATION: 98 % | WEIGHT: 245.2 LBS | SYSTOLIC BLOOD PRESSURE: 106 MMHG | BODY MASS INDEX: 38.48 KG/M2 | HEIGHT: 67 IN | TEMPERATURE: 98 F | RESPIRATION RATE: 16 BRPM | DIASTOLIC BLOOD PRESSURE: 68 MMHG | HEART RATE: 80 BPM

## 2025-05-16 DIAGNOSIS — M19.041 LOCALIZED OSTEOARTHRITIS OF RIGHT HAND: ICD-10-CM

## 2025-05-16 DIAGNOSIS — G43.709 CHRONIC MIGRAINE WITHOUT AURA WITHOUT STATUS MIGRAINOSUS, NOT INTRACTABLE: ICD-10-CM

## 2025-05-16 DIAGNOSIS — M65.4 DE QUERVAIN'S DISEASE (TENOSYNOVITIS): Primary | ICD-10-CM

## 2025-05-16 DIAGNOSIS — G93.5 ARNOLD-CHIARI MALFORMATION, TYPE I (HCC): ICD-10-CM

## 2025-05-16 RX ORDER — KETOROLAC TROMETHAMINE 30 MG/ML
60 INJECTION, SOLUTION INTRAMUSCULAR; INTRAVENOUS ONCE
Status: COMPLETED | OUTPATIENT
Start: 2025-05-16 | End: 2025-05-16

## 2025-05-16 RX ADMIN — KETOROLAC TROMETHAMINE 60 MG: 30 INJECTION, SOLUTION INTRAMUSCULAR; INTRAVENOUS at 10:23

## 2025-05-16 NOTE — PROGRESS NOTES
Have you been to the ER, urgent care clinic since your last visit?  Hospitalized since your last visit?   NO    Have you seen or consulted any other health care providers outside our system since your last visit?   NO     “Have you had a pap smear?”    NO    No cervical cancer screening on file       “Have you had a colorectal cancer screening such as a colonoscopy/FIT/Cologuard?    NO    No colonoscopy on file  No cologuard on file  No FIT/FOBT on file   No flexible sigmoidoscopy on file     “Have you had a diabetic eye exam?”    NO     No diabetic eye exam on file

## 2025-05-16 NOTE — PROGRESS NOTES
Subjective  Chief Complaint   Patient presents with    Other     Need cortisone injections R wrist & R index finger     HPI:  Nikki Lozano is a 54 y.o. female.    History of Present Illness  The patient presents for evaluation of chronic migraines and osteoarthritis of the right hand.    She reports experiencing migraines since Monday, which have not worsened in severity. The frequency of these migraines is more than 15 days per month. She has been managing her symptoms with Fioricet, which provides relief but does not prevent the recurrence of the migraines. She is seeking a Toradol injection today as an additional treatment measure.    She has been diagnosed with de Quervain's tenosynovitis, a condition affecting her wrist. She received an injection approximately a year ago, which provided significant relief. She notes that the effectiveness of the injections is prolonged if she refrains from crocheting.  She request another injection today.    She also reports recurrence of the arthritis in her right second DIP joint.  She would like a cortisone injection there as well.        Past Medical History:   Diagnosis Date    Allergic rhinitis     Allergies     Asthma     COPD (chronic obstructive pulmonary disease) ([facility])     Depression     Diabetes ([facility])     GERD (gastroesophageal reflux disease)     Hypertension     Migraine     Obesity     Type 2 diabetes mellitus without complication ([facility])      Current Outpatient Medications on File Prior to Visit   Medication Sig Dispense Refill    ondansetron (ZOFRAN-ODT) 8 MG TBDP disintegrating tablet Take 1 tablet by mouth every 8 hours as needed for Nausea or Vomiting 60 tablet 1    promethazine (PHENERGAN) 25 MG tablet Take 1 tablet by mouth 4 times daily as needed for Nausea 60 tablet 1    telmisartan (MICARDIS) 80 MG tablet TAKE 1 TABLET AT BEDTIME 90 tablet 3    oxyCODONE-acetaminophen (PERCOCET) 7.5-325 MG per tablet Take 1 tablet by mouth every 6 hours as needed for Pain

## 2025-05-16 NOTE — ASSESSMENT & PLAN NOTE
Patient reports recurrent OA pain in right second DIP.  She request a cortisone injection as that has worked in the past.  Her last injection was a year ago.  Shot given today.     Procedure Documentation:  After discussion of the risks and benefits and obtaining verbal consent, the patient elected to proceed with a cortisone injection into the right second DIP it was confirmed that the patient does not have history of prior adverse reactions, active infections, or relevant allergies. There was no effusion, erythema, or warmth, and the skin was clear.    * Patient was identified by name and date of birth   * Agreement on procedure being performed was verified  * Risks and Benefits explained to the patient  * Procedure site verified and marked as necessary  * Patient was positioned for comfort  * Consent was signed and verified    Utilizing sterile technique, the skin was prepped.The sites were injected with a mixture of 10 mg of Kenalog and 0.25cc 1% lidocaine using a 25 gauge needle. The injection(s) was/were completed without complication, and a sterile bandage was applied. The patient tolerated the injection(s) well.  The patient will call immediately with any signs of infection or allergic reaction.     Time: current in chart  Date of procedure: 5/16/2025  Procedure performed by:  Reji Huerta MD

## 2025-05-16 NOTE — ASSESSMENT & PLAN NOTE
Symptoms worsening.  They improved with cortisone injection close 2-year ago.  She request another injection.    Procedure Documentation:  After discussion of the risks and benefits and obtaining verbal consent, the patient elected to proceed with a cortisone injection into right medial wrist.  It was confirmed that the patient does not have history of prior adverse reactions, active infections, or relevant allergies. There was no effusion, erythema, or warmth, and the skin was clear.    * Patient was identified by name and date of birth   * Agreement on procedure being performed was verified  * Risks and Benefits explained to the patient  * Procedure site verified and marked as necessary  * Patient was positioned for comfort  * Consent was signed and verified    Utilizing sterile technique, the skin was prepped.The sites were injected with a mixture of 20 mg of Kenalog and 05cc 1% lidocaine using a 25 gauge needle. The injection(s) was/were completed without complication, and a sterile bandage was applied. The patient tolerated the injection(s) well.  The patient will call immediately with any signs of infection or allergic reaction.     Time: current in chart  Date of procedure: 5/16/2025  Procedure performed by:  Reji Huerta MD

## 2025-05-19 DIAGNOSIS — G93.5 ARNOLD-CHIARI MALFORMATION, TYPE I (HCC): ICD-10-CM

## 2025-05-20 RX ORDER — OXYCODONE AND ACETAMINOPHEN 7.5; 325 MG/1; MG/1
1 TABLET ORAL EVERY 6 HOURS PRN
Qty: 120 TABLET | Refills: 0 | Status: SHIPPED | OUTPATIENT
Start: 2025-05-20 | End: 2025-06-19

## 2025-05-30 DIAGNOSIS — R11.11 VOMITING WITHOUT NAUSEA, UNSPECIFIED VOMITING TYPE: ICD-10-CM

## 2025-06-01 DIAGNOSIS — M25.561 ACUTE PAIN OF RIGHT KNEE: ICD-10-CM

## 2025-06-02 RX ORDER — HYDROCHLOROTHIAZIDE 25 MG/1
25 TABLET ORAL DAILY
Qty: 90 TABLET | Refills: 3 | Status: SHIPPED | OUTPATIENT
Start: 2025-06-02

## 2025-06-02 RX ORDER — PROMETHAZINE HYDROCHLORIDE 25 MG/1
25 TABLET ORAL 4 TIMES DAILY PRN
Qty: 60 TABLET | Refills: 1 | Status: SHIPPED | OUTPATIENT
Start: 2025-06-02 | End: 2025-07-02

## 2025-06-02 RX ORDER — DICLOFENAC SODIUM 75 MG/1
75 TABLET, DELAYED RELEASE ORAL 2 TIMES DAILY
Qty: 180 TABLET | Refills: 3 | Status: SHIPPED | OUTPATIENT
Start: 2025-06-02 | End: 2025-06-05 | Stop reason: ALTCHOICE

## 2025-06-05 ENCOUNTER — OFFICE VISIT (OUTPATIENT)
Dept: PRIMARY CARE CLINIC | Facility: CLINIC | Age: 55
End: 2025-06-05

## 2025-06-05 VITALS
TEMPERATURE: 98.2 F | BODY MASS INDEX: 37.59 KG/M2 | SYSTOLIC BLOOD PRESSURE: 119 MMHG | HEART RATE: 84 BPM | WEIGHT: 240 LBS | DIASTOLIC BLOOD PRESSURE: 86 MMHG | OXYGEN SATURATION: 98 %

## 2025-06-05 DIAGNOSIS — S46.211A BICEPS STRAIN, RIGHT, INITIAL ENCOUNTER: Primary | ICD-10-CM

## 2025-06-05 RX ORDER — MELOXICAM 15 MG/1
15 TABLET ORAL DAILY
Qty: 30 TABLET | Refills: 1 | Status: SHIPPED | OUTPATIENT
Start: 2025-06-05

## 2025-06-05 ASSESSMENT — ENCOUNTER SYMPTOMS
ABDOMINAL PAIN: 0
CONSTIPATION: 0
DIARRHEA: 0
SHORTNESS OF BREATH: 0

## 2025-06-05 NOTE — PROGRESS NOTES
Have you been to the ER, urgent care clinic since your last visit?  Hospitalized since your last visit?   NO    Have you seen or consulted any other health care providers outside our system since your last visit?   Yes, [facility] for knee

## 2025-06-05 NOTE — PROGRESS NOTES
PCP: Reji Huerta MD    CC: Arm Pain (Right bicep painful for past 3 weeks. Pain gets worse throughout the day the more she uses it. Pain can be sharp/stabbing to a cramp-like pain)      Subjective      Nikki Lozano is a 54 y.o. female,Established patient, who presents for right bicep pain.     History of Present Illness  The patient presents for evaluation of bicep pain.    She has been experiencing discomfort in her bicep for approximately 3 weeks, which she attributes to a gardening incident involving the use of a new tiller. The pain is exacerbated by shoulder retraction and certain movements. She reports no associated bruising or popping sensation during the initial injury. She also reports no numbness or tingling in her arm. Her current pain management regimen includes oral diclofenac.    Additionally, she mentions a previous issue with de Quervain's tendinosis, for which she received a steroid injection from Dr. Huerta about 3 to 4 weeks ago. She occasionally uses a thumb brace for support.      Review of Systems   Constitutional:  Negative for chills, fever and unexpected weight change.   HENT: Negative.     Eyes:  Negative for visual disturbance.   Respiratory:  Negative for shortness of breath.    Gastrointestinal:  Negative for abdominal pain, constipation and diarrhea.   Genitourinary: Negative.    Musculoskeletal:  Positive for myalgias.   Skin:  Negative for rash.   Neurological:  Negative for dizziness and headaches.   Psychiatric/Behavioral:  Negative for dysphoric mood and suicidal ideas.        Objective      Vitals:    06/05/25 0856   BP: 119/86   Pulse: 84   Temp: 98.2 °F (36.8 °C)   SpO2: 98%   Weight: 108.9 kg (240 lb)      Body mass index is 37.59 kg/m².       Physical Exam  HENT:      Head: Normocephalic.      Right Ear: External ear normal.      Left Ear: External ear normal.      Nose: Nose normal.   Eyes:      Extraocular Movements: Extraocular movements intact.

## 2025-06-13 ENCOUNTER — PATIENT MESSAGE (OUTPATIENT)
Dept: PRIMARY CARE CLINIC | Facility: CLINIC | Age: 55
End: 2025-06-13

## 2025-06-13 DIAGNOSIS — G43.709 CHRONIC MIGRAINE WITHOUT AURA WITHOUT STATUS MIGRAINOSUS, NOT INTRACTABLE: Primary | ICD-10-CM

## 2025-06-13 RX ORDER — KETOROLAC TROMETHAMINE 10 MG/1
10 TABLET, FILM COATED ORAL EVERY 6 HOURS PRN
Qty: 20 TABLET | Refills: 0 | Status: SHIPPED | OUTPATIENT
Start: 2025-06-13 | End: 2026-06-13

## 2025-06-18 DIAGNOSIS — G93.5 ARNOLD-CHIARI MALFORMATION, TYPE I (HCC): ICD-10-CM

## 2025-06-19 RX ORDER — OXYCODONE AND ACETAMINOPHEN 7.5; 325 MG/1; MG/1
1 TABLET ORAL EVERY 6 HOURS PRN
Qty: 120 TABLET | Refills: 0 | Status: SHIPPED | OUTPATIENT
Start: 2025-06-19 | End: 2025-07-19

## 2025-06-30 ENCOUNTER — PATIENT MESSAGE (OUTPATIENT)
Dept: PRIMARY CARE CLINIC | Facility: CLINIC | Age: 55
End: 2025-06-30

## 2025-06-30 DIAGNOSIS — K52.9 CHRONIC DIARRHEA: Primary | ICD-10-CM

## 2025-07-03 RX ORDER — HYDROCHLOROTHIAZIDE 25 MG/1
25 TABLET ORAL DAILY
Qty: 90 TABLET | Refills: 3 | Status: SHIPPED | OUTPATIENT
Start: 2025-07-03

## 2025-07-16 ENCOUNTER — PATIENT MESSAGE (OUTPATIENT)
Dept: PRIMARY CARE CLINIC | Facility: CLINIC | Age: 55
End: 2025-07-16

## 2025-07-16 DIAGNOSIS — S83.206D TEAR OF MENISCUS OF RIGHT KNEE AS CURRENT INJURY, UNSPECIFIED MENISCUS, UNSPECIFIED TEAR TYPE, SUBSEQUENT ENCOUNTER: Primary | ICD-10-CM

## 2025-07-16 DIAGNOSIS — G93.5 ARNOLD-CHIARI MALFORMATION, TYPE I (HCC): ICD-10-CM

## 2025-07-17 RX ORDER — OXYCODONE AND ACETAMINOPHEN 7.5; 325 MG/1; MG/1
1 TABLET ORAL EVERY 6 HOURS PRN
Qty: 120 TABLET | Refills: 0 | Status: SHIPPED | OUTPATIENT
Start: 2025-07-17 | End: 2025-08-16

## 2025-07-18 RX ORDER — DICLOFENAC SODIUM 75 MG/1
75 TABLET, DELAYED RELEASE ORAL 2 TIMES DAILY
Qty: 60 TABLET | Refills: 1 | Status: SHIPPED | OUTPATIENT
Start: 2025-07-18

## 2025-08-11 DIAGNOSIS — S83.206D TEAR OF MENISCUS OF RIGHT KNEE AS CURRENT INJURY, UNSPECIFIED MENISCUS, UNSPECIFIED TEAR TYPE, SUBSEQUENT ENCOUNTER: ICD-10-CM

## 2025-08-12 DIAGNOSIS — M25.561 ACUTE PAIN OF RIGHT KNEE: ICD-10-CM

## 2025-08-12 RX ORDER — DICLOFENAC SODIUM 75 MG/1
75 TABLET, DELAYED RELEASE ORAL 2 TIMES DAILY
Qty: 180 TABLET | Refills: 1 | Status: SHIPPED | OUTPATIENT
Start: 2025-08-12

## 2025-08-12 RX ORDER — AMLODIPINE BESYLATE 10 MG/1
10 TABLET ORAL NIGHTLY
Qty: 90 TABLET | Refills: 1 | Status: SHIPPED | OUTPATIENT
Start: 2025-08-12

## 2025-08-13 DIAGNOSIS — G93.5 ARNOLD-CHIARI MALFORMATION, TYPE I (HCC): ICD-10-CM

## 2025-08-14 RX ORDER — OXYCODONE AND ACETAMINOPHEN 7.5; 325 MG/1; MG/1
1 TABLET ORAL EVERY 6 HOURS PRN
Qty: 120 TABLET | Refills: 0 | Status: SHIPPED | OUTPATIENT
Start: 2025-08-14 | End: 2025-09-13

## 2025-08-26 ENCOUNTER — TELEPHONE (OUTPATIENT)
Age: 55
End: 2025-08-26

## 2025-08-26 ENCOUNTER — OFFICE VISIT (OUTPATIENT)
Dept: PRIMARY CARE CLINIC | Facility: CLINIC | Age: 55
End: 2025-08-26
Payer: OTHER GOVERNMENT

## 2025-08-26 VITALS
HEIGHT: 67 IN | TEMPERATURE: 98.6 F | SYSTOLIC BLOOD PRESSURE: 122 MMHG | WEIGHT: 251 LBS | BODY MASS INDEX: 39.39 KG/M2 | RESPIRATION RATE: 16 BRPM | OXYGEN SATURATION: 98 % | HEART RATE: 73 BPM | DIASTOLIC BLOOD PRESSURE: 76 MMHG

## 2025-08-26 DIAGNOSIS — K52.9 CHRONIC DIARRHEA: ICD-10-CM

## 2025-08-26 DIAGNOSIS — E11.69 TYPE 2 DIABETES MELLITUS WITH OBESITY (HCC): ICD-10-CM

## 2025-08-26 DIAGNOSIS — M35.7 HYPERMOBILITY SYNDROME: ICD-10-CM

## 2025-08-26 DIAGNOSIS — H66.91 ACUTE RIGHT OTITIS MEDIA: Primary | ICD-10-CM

## 2025-08-26 DIAGNOSIS — E66.9 TYPE 2 DIABETES MELLITUS WITH OBESITY (HCC): ICD-10-CM

## 2025-08-26 PROCEDURE — 99214 OFFICE O/P EST MOD 30 MIN: CPT | Performed by: FAMILY MEDICINE

## 2025-08-26 PROCEDURE — 3078F DIAST BP <80 MM HG: CPT | Performed by: FAMILY MEDICINE

## 2025-08-26 PROCEDURE — 3074F SYST BP LT 130 MM HG: CPT | Performed by: FAMILY MEDICINE

## 2025-08-26 RX ORDER — LEVOFLOXACIN 500 MG/1
500 TABLET, FILM COATED ORAL DAILY
Qty: 14 TABLET | Refills: 0 | Status: SHIPPED | OUTPATIENT
Start: 2025-08-26 | End: 2025-09-09

## 2025-08-26 RX ORDER — PREDNISONE 20 MG/1
20 TABLET ORAL 2 TIMES DAILY
Qty: 10 TABLET | Refills: 0 | Status: SHIPPED | OUTPATIENT
Start: 2025-08-26 | End: 2025-08-31

## 2025-08-27 LAB
ALBUMIN SERPL-MCNC: 4.6 G/DL (ref 3.8–4.9)
ALBUMIN/CREAT UR: 11 MG/G CREAT (ref 0–29)
ALP SERPL-CCNC: 129 IU/L (ref 44–121)
ALT SERPL-CCNC: 56 IU/L (ref 0–32)
AST SERPL-CCNC: 17 IU/L (ref 0–40)
BASOPHILS # BLD AUTO: 0.1 X10E3/UL (ref 0–0.2)
BASOPHILS NFR BLD AUTO: 1 %
BILIRUB SERPL-MCNC: 0.3 MG/DL (ref 0–1.2)
BUN SERPL-MCNC: 10 MG/DL (ref 6–24)
BUN/CREAT SERPL: 10 (ref 9–23)
CALCIUM SERPL-MCNC: 10.4 MG/DL (ref 8.7–10.2)
CHLORIDE SERPL-SCNC: 98 MMOL/L (ref 96–106)
CHOLEST SERPL-MCNC: 387 MG/DL (ref 100–199)
CO2 SERPL-SCNC: 23 MMOL/L (ref 20–29)
CREAT SERPL-MCNC: 0.99 MG/DL (ref 0.57–1)
CREAT UR-MCNC: 357.9 MG/DL
EGFRCR SERPLBLD CKD-EPI 2021: 67 ML/MIN/1.73
EOSINOPHIL # BLD AUTO: 0.2 X10E3/UL (ref 0–0.4)
EOSINOPHIL NFR BLD AUTO: 2 %
ERYTHROCYTE [DISTWIDTH] IN BLOOD BY AUTOMATED COUNT: 14.2 % (ref 11.7–15.4)
EST. AVERAGE GLUCOSE BLD GHB EST-MCNC: 140 MG/DL
GLOBULIN SER CALC-MCNC: 2.9 G/DL (ref 1.5–4.5)
GLUCOSE SERPL-MCNC: 125 MG/DL (ref 70–99)
HBA1C MFR BLD: 6.5 % (ref 4.8–5.6)
HCT VFR BLD AUTO: 40.9 % (ref 34–46.6)
HDLC SERPL-MCNC: 46 MG/DL
HGB BLD-MCNC: 12.7 G/DL (ref 11.1–15.9)
IMM GRANULOCYTES # BLD AUTO: 0 X10E3/UL (ref 0–0.1)
IMM GRANULOCYTES NFR BLD AUTO: 0 %
LDLC SERPL CALC-MCNC: 234 MG/DL (ref 0–99)
LYMPHOCYTES # BLD AUTO: 2.4 X10E3/UL (ref 0.7–3.1)
LYMPHOCYTES NFR BLD AUTO: 27 %
Lab: ABNORMAL
MCH RBC QN AUTO: 28.3 PG (ref 26.6–33)
MCHC RBC AUTO-ENTMCNC: 31.1 G/DL (ref 31.5–35.7)
MCV RBC AUTO: 91 FL (ref 79–97)
MICROALBUMIN UR-MCNC: 37.7 UG/ML
MONOCYTES # BLD AUTO: 0.6 X10E3/UL (ref 0.1–0.9)
MONOCYTES NFR BLD AUTO: 7 %
NEUTROPHILS # BLD AUTO: 5.7 X10E3/UL (ref 1.4–7)
NEUTROPHILS NFR BLD AUTO: 63 %
PLATELET # BLD AUTO: 304 X10E3/UL (ref 150–450)
POTASSIUM SERPL-SCNC: 4.2 MMOL/L (ref 3.5–5.2)
PROT SERPL-MCNC: 7.5 G/DL (ref 6–8.5)
RBC # BLD AUTO: 4.48 X10E6/UL (ref 3.77–5.28)
SODIUM SERPL-SCNC: 139 MMOL/L (ref 134–144)
TRIGL SERPL-MCNC: 475 MG/DL (ref 0–149)
VLDLC SERPL CALC-MCNC: 107 MG/DL (ref 5–40)
WBC # BLD AUTO: 9 X10E3/UL (ref 3.4–10.8)

## 2025-08-28 ENCOUNTER — RESULTS FOLLOW-UP (OUTPATIENT)
Dept: PRIMARY CARE CLINIC | Facility: CLINIC | Age: 55
End: 2025-08-28

## 2025-08-28 DIAGNOSIS — F32.A DEPRESSIVE DISORDER: ICD-10-CM

## 2025-08-28 DIAGNOSIS — E78.2 MIXED HYPERLIPIDEMIA: Primary | ICD-10-CM

## 2025-08-28 LAB — TRYPTASE SERPL-MCNC: 1.9 UG/L (ref 2.2–13.2)

## 2025-09-04 ENCOUNTER — PATIENT MESSAGE (OUTPATIENT)
Dept: PRIMARY CARE CLINIC | Facility: CLINIC | Age: 55
End: 2025-09-04

## 2025-09-04 ENCOUNTER — TELEPHONE (OUTPATIENT)
Age: 55
End: 2025-09-04

## 2025-09-04 DIAGNOSIS — H66.91 ACUTE RIGHT OTITIS MEDIA: ICD-10-CM

## 2025-09-04 RX ORDER — PREDNISONE 20 MG/1
20 TABLET ORAL 2 TIMES DAILY
Qty: 10 TABLET | Refills: 0 | Status: SHIPPED | OUTPATIENT
Start: 2025-09-04 | End: 2025-09-09

## 2025-09-04 RX ORDER — FLUOXETINE HYDROCHLORIDE 40 MG/1
40 CAPSULE ORAL 2 TIMES DAILY
Qty: 180 CAPSULE | Refills: 1 | Status: SHIPPED | OUTPATIENT
Start: 2025-09-04

## 2025-09-04 RX ORDER — ATORVASTATIN CALCIUM 20 MG/1
20 TABLET, FILM COATED ORAL NIGHTLY
Qty: 90 TABLET | Refills: 1 | Status: SHIPPED | OUTPATIENT
Start: 2025-09-04